# Patient Record
Sex: FEMALE | Race: WHITE | NOT HISPANIC OR LATINO | Employment: OTHER | URBAN - METROPOLITAN AREA
[De-identification: names, ages, dates, MRNs, and addresses within clinical notes are randomized per-mention and may not be internally consistent; named-entity substitution may affect disease eponyms.]

---

## 2017-01-06 ENCOUNTER — ALLSCRIPTS OFFICE VISIT (OUTPATIENT)
Dept: OTHER | Facility: OTHER | Age: 67
End: 2017-01-06

## 2017-07-07 ENCOUNTER — ALLSCRIPTS OFFICE VISIT (OUTPATIENT)
Dept: OTHER | Facility: OTHER | Age: 67
End: 2017-07-07

## 2017-07-07 ENCOUNTER — TRANSCRIBE ORDERS (OUTPATIENT)
Dept: ADMINISTRATIVE | Facility: HOSPITAL | Age: 67
End: 2017-07-07

## 2017-07-07 DIAGNOSIS — I20.0 UNSTABLE ANGINA PECTORIS (HCC): ICD-10-CM

## 2017-07-07 DIAGNOSIS — R07.89 OTHER CHEST PAIN: ICD-10-CM

## 2017-07-07 DIAGNOSIS — M54.12 RADICULOPATHY OF CERVICAL REGION: ICD-10-CM

## 2017-07-07 DIAGNOSIS — M54.12 BRACHIAL NEURITIS OR RADICULITIS NOS: Primary | ICD-10-CM

## 2017-07-10 ENCOUNTER — TELEPHONE (OUTPATIENT)
Dept: PREADMISSION TESTING | Facility: HOSPITAL | Age: 67
End: 2017-07-10

## 2017-07-13 ENCOUNTER — GENERIC CONVERSION - ENCOUNTER (OUTPATIENT)
Dept: OTHER | Facility: OTHER | Age: 67
End: 2017-07-13

## 2017-07-13 ENCOUNTER — TELEPHONE (OUTPATIENT)
Dept: PREADMISSION TESTING | Facility: HOSPITAL | Age: 67
End: 2017-07-13

## 2017-07-14 ENCOUNTER — TELEPHONE (OUTPATIENT)
Dept: PREADMISSION TESTING | Facility: HOSPITAL | Age: 67
End: 2017-07-14

## 2017-07-20 ENCOUNTER — TELEPHONE (OUTPATIENT)
Dept: PREADMISSION TESTING | Facility: HOSPITAL | Age: 67
End: 2017-07-20

## 2017-08-02 ENCOUNTER — HOSPITAL ENCOUNTER (OUTPATIENT)
Dept: RADIOLOGY | Facility: HOSPITAL | Age: 67
Discharge: HOME | End: 2017-08-02
Attending: PSYCHIATRY & NEUROLOGY
Payer: COMMERCIAL

## 2017-08-02 ENCOUNTER — ANESTHESIA EVENT (OUTPATIENT)
Dept: RADIOLOGY | Facility: HOSPITAL | Age: 67
End: 2017-08-02

## 2017-08-02 ENCOUNTER — ANESTHESIA (OUTPATIENT)
Dept: RADIOLOGY | Facility: HOSPITAL | Age: 67
End: 2017-08-02

## 2017-08-02 VITALS
HEIGHT: 63 IN | SYSTOLIC BLOOD PRESSURE: 159 MMHG | OXYGEN SATURATION: 100 % | HEART RATE: 81 BPM | DIASTOLIC BLOOD PRESSURE: 81 MMHG | BODY MASS INDEX: 20.38 KG/M2 | RESPIRATION RATE: 16 BRPM | TEMPERATURE: 97.5 F | WEIGHT: 115 LBS

## 2017-08-02 DIAGNOSIS — R07.89 OTHER CHEST PAIN: ICD-10-CM

## 2017-08-02 DIAGNOSIS — M54.12 RADICULOPATHY OF CERVICAL REGION: ICD-10-CM

## 2017-08-02 PROCEDURE — 72141 MRI NECK SPINE W/O DYE: CPT

## 2017-08-02 PROCEDURE — 72146 MRI CHEST SPINE W/O DYE: CPT

## 2017-08-02 RX ORDER — CITALOPRAM 10 MG/1
5 TABLET ORAL DAILY
COMMUNITY
End: 2018-04-13 | Stop reason: ALTCHOICE

## 2017-08-02 RX ORDER — SODIUM CHLORIDE, SODIUM LACTATE, POTASSIUM CHLORIDE, CALCIUM CHLORIDE 600; 310; 30; 20 MG/100ML; MG/100ML; MG/100ML; MG/100ML
20 INJECTION, SOLUTION INTRAVENOUS CONTINUOUS
Status: DISCONTINUED | OUTPATIENT
Start: 2017-08-02 | End: 2017-08-03 | Stop reason: HOSPADM

## 2017-08-02 RX ADMIN — SODIUM CHLORIDE, SODIUM LACTATE, POTASSIUM CHLORIDE, AND CALCIUM CHLORIDE 20 ML/HR: .6; .31; .03; .02 INJECTION, SOLUTION INTRAVENOUS at 11:39

## 2017-09-05 ENCOUNTER — DOCTOR'S OFFICE (OUTPATIENT)
Dept: URBAN - METROPOLITAN AREA CLINIC 136 | Facility: CLINIC | Age: 67
Setting detail: OPHTHALMOLOGY
End: 2017-09-05
Payer: COMMERCIAL

## 2017-09-05 DIAGNOSIS — H25.13: ICD-10-CM

## 2017-09-05 DIAGNOSIS — H52.4: ICD-10-CM

## 2017-09-05 DIAGNOSIS — H52.03: ICD-10-CM

## 2017-09-05 DIAGNOSIS — H52.223: ICD-10-CM

## 2017-09-05 DIAGNOSIS — H35.711: ICD-10-CM

## 2017-09-05 DIAGNOSIS — H04.123: ICD-10-CM

## 2017-09-05 PROCEDURE — 92015 DETERMINE REFRACTIVE STATE: CPT | Performed by: OPTOMETRIST

## 2017-09-05 PROCEDURE — 92014 COMPRE OPH EXAM EST PT 1/>: CPT | Performed by: OPTOMETRIST

## 2017-09-05 ASSESSMENT — REFRACTION_AUTOREFRACTION
OD_CYLINDER: -1.25
OS_SPHERE: +3.00
OD_SPHERE: +3.25
OD_AXIS: 103
OS_AXIS: 100
OS_CYLINDER: -0.75

## 2017-09-05 ASSESSMENT — REFRACTION_CURRENTRX
OS_CYLINDER: -0.75
OD_OVR_VA: 20/
OD_AXIS: 099
OD_CYLINDER: -1.00
OD_ADD: +2.75
OS_SPHERE: +3.00
OS_AXIS: 089
OS_VPRISM_DIRECTION: PROGS
OS_ADD: +2.75
OD_OVR_VA: 20/
OD_VPRISM_DIRECTION: PROGS
OD_SPHERE: +3.00
OS_OVR_VA: 20/
OS_OVR_VA: 20/
OD_OVR_VA: 20/
OS_OVR_VA: 20/

## 2017-09-05 ASSESSMENT — REFRACTION_OUTSIDERX
OD_VA1: 20/20-3
OS_VA1: 20/20-2
OS_VA3: 20/
OS_SPHERE: +3.50
OU_VA: 20/20-2
OD_VA2: 20/20
OD_SPHERE: +3.25
OS_VA2: 20/20
OS_CYLINDER: -0.75
OS_ADD: +2.75
OD_AXIS: 100
OD_ADD: +2.75
OD_VA3: 20/
OS_AXIS: 090
OD_CYLINDER: -1.25

## 2017-09-05 ASSESSMENT — SPHEQUIV_DERIVED
OD_SPHEQUIV: 2.625
OS_SPHEQUIV: 2.625

## 2017-09-05 ASSESSMENT — REFRACTION_MANIFEST
OS_VA3: 20/
OS_VA2: 20/
OD_VA3: 20/
OS_VA1: 20/
OU_VA: 20/
OS_VA1: 20/
OD_VA2: 20/
OS_VA3: 20/
OS_VA2: 20/
OD_VA3: 20/
OD_VA1: 20/
OD_VA2: 20/
OD_VA1: 20/
OU_VA: 20/

## 2017-09-05 ASSESSMENT — VISUAL ACUITY
OD_BCVA: 20/30-1
OS_BCVA: 20/20-2

## 2017-09-05 ASSESSMENT — DECREASING TEAR LAKE - SEVERITY SCORE
OD_DEC_TEARLAKE: 2+
OS_DEC_TEARLAKE: 2+

## 2017-09-05 ASSESSMENT — TEAR BREAK UP TIME (TBUT)
OD_TBUT: 1+
OS_TBUT: 1+

## 2017-09-05 ASSESSMENT — CONFRONTATIONAL VISUAL FIELD TEST (CVF)
OS_FINDINGS: FULL
OD_FINDINGS: FULL

## 2017-10-03 ENCOUNTER — TRANSCRIBE ORDERS (OUTPATIENT)
Dept: ADMINISTRATIVE | Facility: HOSPITAL | Age: 67
End: 2017-10-03

## 2017-10-03 DIAGNOSIS — Z78.0 MENOPAUSE: Primary | ICD-10-CM

## 2017-10-03 DIAGNOSIS — Z12.31 ENCOUNTER FOR SCREENING MAMMOGRAM FOR MALIGNANT NEOPLASM OF BREAST: Primary | ICD-10-CM

## 2017-11-14 ENCOUNTER — HOSPITAL ENCOUNTER (OUTPATIENT)
Dept: MAMMOGRAPHY | Facility: MEDICAL CENTER | Age: 67
Discharge: HOME | End: 2017-11-14
Payer: COMMERCIAL

## 2017-11-14 ENCOUNTER — TRANSCRIBE ORDERS (OUTPATIENT)
Dept: ADMINISTRATIVE | Facility: HOSPITAL | Age: 67
End: 2017-11-14

## 2017-11-14 ENCOUNTER — HOSPITAL ENCOUNTER (OUTPATIENT)
Dept: BONE DENSITY | Facility: MEDICAL CENTER | Age: 67
Discharge: HOME | End: 2017-11-14
Payer: COMMERCIAL

## 2017-11-14 DIAGNOSIS — Z78.0 MENOPAUSE: ICD-10-CM

## 2017-11-14 DIAGNOSIS — Z12.31 ENCOUNTER FOR SCREENING MAMMOGRAM FOR MALIGNANT NEOPLASM OF BREAST: ICD-10-CM

## 2017-11-14 DIAGNOSIS — Z12.39 SCREENING FOR BREAST CANCER: Primary | ICD-10-CM

## 2017-11-14 PROCEDURE — G0202 SCR MAMMO BI INCL CAD: HCPCS

## 2017-11-14 PROCEDURE — 77080 DXA BONE DENSITY AXIAL: CPT

## 2018-01-13 VITALS
SYSTOLIC BLOOD PRESSURE: 134 MMHG | BODY MASS INDEX: 20.23 KG/M2 | HEART RATE: 89 BPM | WEIGHT: 116 LBS | DIASTOLIC BLOOD PRESSURE: 61 MMHG

## 2018-01-13 VITALS
BODY MASS INDEX: 21.62 KG/M2 | WEIGHT: 124 LBS | SYSTOLIC BLOOD PRESSURE: 135 MMHG | DIASTOLIC BLOOD PRESSURE: 69 MMHG | HEART RATE: 86 BPM

## 2018-04-13 ENCOUNTER — OFFICE VISIT (OUTPATIENT)
Dept: NEUROLOGY | Facility: CLINIC | Age: 68
End: 2018-04-13
Payer: COMMERCIAL

## 2018-04-13 VITALS
BODY MASS INDEX: 21.24 KG/M2 | DIASTOLIC BLOOD PRESSURE: 69 MMHG | SYSTOLIC BLOOD PRESSURE: 146 MMHG | WEIGHT: 118 LBS | HEART RATE: 102 BPM

## 2018-04-13 DIAGNOSIS — F41.1 GENERALIZED ANXIETY DISORDER: ICD-10-CM

## 2018-04-13 DIAGNOSIS — R20.2 PARESTHESIAS: ICD-10-CM

## 2018-04-13 DIAGNOSIS — M54.12 CERVICAL RADICULOPATHY: Primary | ICD-10-CM

## 2018-04-13 PROBLEM — F41.9 ANXIETY DISORDER: Status: ACTIVE | Noted: 2018-04-13

## 2018-04-13 PROCEDURE — 99214 OFFICE O/P EST MOD 30 MIN: CPT | Performed by: PSYCHIATRY & NEUROLOGY

## 2018-04-13 RX ORDER — ESCITALOPRAM OXALATE 5 MG/1
15 TABLET ORAL DAILY
COMMUNITY

## 2018-04-13 NOTE — PROGRESS NOTES
Patient ID: Floyd Real is a 79 y o  female  Assessment/Plan:     Problem List Items Addressed This Visit        Nervous and Auditory    Cervical radiculopathy - Primary       Other    Paresthesias    Anxiety disorder    Relevant Medications    escitalopram (LEXAPRO) 5 mg tablet             Subjective:    HPI   We had the pleasure of evaluating Rigoberto Baker in neurological follow up today  As you know she is a 79year old female  She is a retired   Who is here today for evaluation of her peripheral Neuropathy  Panic attack/anxiety:   -since last seen patient states that she had few panic attacks for which she ended up seeing her primary care physician  He started her on Lexapro 5 mg  She states that since she has been on Lexapro she has not had any chest tightness  She does still get some panic attacks but they have decreased in frequency significantly they may occur now every few months  There is some stressor in her life as she is flying to [place] with her daughter today to adopt their 2nd child  Left arm pain:  - no new concerns  She now feels that she is 75% better  She has ntoed when she is in a hurry she may have things fall out of her hand  Paresthesia in her limbs:   - She has noted she can go two months with no pain but then will start with numbness/tingling in her jaw and then travel to the rest of her body  At time will have tingling sensation her lower back  This may go on for few weeks and then dissipate  90% of the time she feels fine  She states when she upset this tends to get worse for her  She is not interested in taking lorazepam    - No change    Trigger: Finds that fatigue and anxiety will trigger her symptoms       EM  Showed mild to moderated CTS and Left mild C 7 radiculopathy:   -  no CT or MRI done as ct was not covered by insurance and she could not get an open MRI    - Neck pain: Saw pain management and had epidural injection which affected her sleep    I have reviewed the ROS  The following portions of the patient's history were reviewed and updated as appropriate: allergies, current medications, past family history, past medical history, past social history, past surgical history and problem list     Review system review     Objective:    Blood pressure 146/69, pulse 102, weight 53 5 kg (118 lb)  Physical Exam   Constitutional: She appears well-developed  Eyes: EOM are normal  Pupils are equal, round, and reactive to light  Neck: Normal range of motion  Neck supple  Cardiovascular: Normal rate  Pulmonary/Chest: Effort normal    Abdominal: Soft  Musculoskeletal: Normal range of motion  Neurological: She has normal strength and normal reflexes  Gait and coordination normal    Skin: Skin is warm  Psychiatric: She has a normal mood and affect  Her speech is normal        Neurological Exam    Mental Status  The patient is alert  Her speech is normal  Her language is fluent with no aphasia  She has normal attention span and concentration  Cranial Nerves    CN II: The patient's visual acuity and visual fields are normal   CN III, IV, VI: The patient's pupils are equally round and reactive to light and ocular movements are normal   CN V: The patient has normal facial sensation  CN VII:  The patient has symmetric facial movement  CN VIII:  The patient's hearing is normal   CN IX, X: The patient has symmetric palate movement and normal gag reflex  CN XI: The patient's shoulder shrug strength is normal   CN XII: The patient's tongue is midline without atrophy or fasciculations  Motor  The patient has normal muscle bulk throughout  Her overall muscle tone is normal throughout  Her strength is 5/5 throughout all four extremities  Sensory  The patient's sensation is normal in all four extremities      Reflexes  Deep tendon reflexes are 2+ and symmetric in all four extremities with downgoing toes bilaterally  Gait and Coordination  The patient has normal gait and station and normal casual, toe, heel, and tandem gait  She has normal coordination bilaterally  ROS:    Review of Systems   Constitutional: Negative  HENT: Negative  Eyes: Negative  Respiratory: Negative  Cardiovascular: Negative  Gastrointestinal: Negative  Endocrine: Negative  Genitourinary: Negative  Musculoskeletal: Negative  Skin: Negative  Allergic/Immunologic: Negative  Neurological: Negative  Hematological: Negative  Psychiatric/Behavioral: Negative

## 2018-09-05 ENCOUNTER — DOCTOR'S OFFICE (OUTPATIENT)
Dept: URBAN - METROPOLITAN AREA CLINIC 136 | Facility: CLINIC | Age: 68
Setting detail: OPHTHALMOLOGY
End: 2018-09-05
Payer: COMMERCIAL

## 2018-09-05 DIAGNOSIS — H04.123: ICD-10-CM

## 2018-09-05 DIAGNOSIS — H52.4: ICD-10-CM

## 2018-09-05 DIAGNOSIS — H52.223: ICD-10-CM

## 2018-09-05 DIAGNOSIS — H52.03: ICD-10-CM

## 2018-09-05 DIAGNOSIS — H25.13: ICD-10-CM

## 2018-09-05 DIAGNOSIS — H35.711: ICD-10-CM

## 2018-09-05 PROCEDURE — 92014 COMPRE OPH EXAM EST PT 1/>: CPT | Performed by: OPTOMETRIST

## 2018-09-05 PROCEDURE — 92015 DETERMINE REFRACTIVE STATE: CPT | Performed by: OPTOMETRIST

## 2018-09-05 PROCEDURE — 92134 CPTRZ OPH DX IMG PST SGM RTA: CPT | Performed by: OPTOMETRIST

## 2018-09-05 ASSESSMENT — TEAR BREAK UP TIME (TBUT)
OS_TBUT: 1+
OD_TBUT: 1+

## 2018-09-05 ASSESSMENT — REFRACTION_MANIFEST
OD_VA3: 20/
OD_CYLINDER: -1.25
OS_VA3: 20/
OD_VA2: 20/
OS_AXIS: 090
OD_VA1: 20/20-2
OS_VA1: 20/20-2
OD_AXIS: 100
OD_SPHERE: +3.25
OS_VA3: 20/
OU_VA: 20/20-2
OS_VA2: 20/
OS_ADD: +2.75
OS_VA1: 20/
OD_VA2: 20/20
OD_VA1: 20/
OD_ADD: +2.75
OD_VA3: 20/
OS_SPHERE: +3.50
OS_VA2: 20/20
OU_VA: 20/
OS_CYLINDER: -0.75

## 2018-09-05 ASSESSMENT — REFRACTION_AUTOREFRACTION
OS_CYLINDER: -1.00
OD_SPHERE: +3.50
OD_CYLINDER: -1.50
OD_AXIS: 105
OS_SPHERE: +3.75
OS_AXIS: 094

## 2018-09-05 ASSESSMENT — VISUAL ACUITY
OS_BCVA: 20/25
OD_BCVA: 20/30+2

## 2018-09-05 ASSESSMENT — REFRACTION_CURRENTRX
OD_SPHERE: +3.00
OD_AXIS: 099
OS_SPHERE: +3.00
OD_VPRISM_DIRECTION: PROGS
OD_CYLINDER: -1.00
OD_OVR_VA: 20/
OD_ADD: +2.75
OS_OVR_VA: 20/
OS_VPRISM_DIRECTION: PROGS
OS_OVR_VA: 20/
OS_AXIS: 089
OS_OVR_VA: 20/
OS_ADD: +2.75
OS_CYLINDER: -0.75
OD_OVR_VA: 20/
OD_OVR_VA: 20/

## 2018-09-05 ASSESSMENT — SPHEQUIV_DERIVED
OD_SPHEQUIV: 2.75
OS_SPHEQUIV: 3.125
OS_SPHEQUIV: 3.25
OD_SPHEQUIV: 2.625

## 2018-09-05 ASSESSMENT — DECREASING TEAR LAKE - SEVERITY SCORE
OD_DEC_TEARLAKE: 2+
OS_DEC_TEARLAKE: 2+

## 2018-09-05 ASSESSMENT — CONFRONTATIONAL VISUAL FIELD TEST (CVF)
OD_FINDINGS: FULL
OS_FINDINGS: FULL

## 2018-10-04 DIAGNOSIS — G43.709 CHRONIC MIGRAINE WITHOUT AURA WITHOUT STATUS MIGRAINOSUS, NOT INTRACTABLE: Primary | ICD-10-CM

## 2018-10-04 RX ORDER — NORTRIPTYLINE HYDROCHLORIDE 10 MG/1
CAPSULE ORAL
Qty: 360 CAPSULE | Refills: 1 | Status: SHIPPED | OUTPATIENT
Start: 2018-10-04 | End: 2019-02-18 | Stop reason: SDUPTHER

## 2018-11-15 ENCOUNTER — HOSPITAL ENCOUNTER (OUTPATIENT)
Dept: MAMMOGRAPHY | Facility: MEDICAL CENTER | Age: 68
Discharge: HOME | End: 2018-11-15
Payer: COMMERCIAL

## 2018-11-15 ENCOUNTER — TRANSCRIBE ORDERS (OUTPATIENT)
Dept: ADMINISTRATIVE | Facility: HOSPITAL | Age: 68
End: 2018-11-15

## 2018-11-15 VITALS — BODY MASS INDEX: 22.08 KG/M2 | WEIGHT: 120 LBS | HEIGHT: 62 IN

## 2018-11-15 DIAGNOSIS — Z12.39 SCREENING FOR BREAST CANCER: ICD-10-CM

## 2018-11-15 DIAGNOSIS — Z12.31 VISIT FOR SCREENING MAMMOGRAM: Primary | ICD-10-CM

## 2018-11-15 PROCEDURE — 77067 SCR MAMMO BI INCL CAD: CPT

## 2019-02-15 ENCOUNTER — TELEPHONE (OUTPATIENT)
Dept: NEUROLOGY | Facility: CLINIC | Age: 69
End: 2019-02-15

## 2019-02-18 DIAGNOSIS — G43.709 CHRONIC MIGRAINE WITHOUT AURA WITHOUT STATUS MIGRAINOSUS, NOT INTRACTABLE: ICD-10-CM

## 2019-02-18 RX ORDER — NORTRIPTYLINE HYDROCHLORIDE 10 MG/1
40 CAPSULE ORAL
Qty: 360 CAPSULE | Refills: 0 | Status: SHIPPED | OUTPATIENT
Start: 2019-02-18 | End: 2019-02-19

## 2019-02-19 NOTE — TELEPHONE ENCOUNTER
Patient states the nortriptyline was sent to wrong pharmacy, she called them and cancelled the order because she needs to use mail order  Patient requesting we resend rx to [facility] order for 90 day supply

## 2019-02-20 RX ORDER — NORTRIPTYLINE HYDROCHLORIDE 10 MG/1
40 CAPSULE ORAL
Qty: 360 CAPSULE | Refills: 0 | Status: SHIPPED | OUTPATIENT
Start: 2019-02-20 | End: 2020-06-16 | Stop reason: SDUPTHER

## 2019-03-21 ENCOUNTER — TELEPHONE (OUTPATIENT)
Dept: NEUROLOGY | Facility: CLINIC | Age: 69
End: 2019-03-21

## 2019-03-27 DIAGNOSIS — G43.709 CHRONIC MIGRAINE WITHOUT AURA WITHOUT STATUS MIGRAINOSUS, NOT INTRACTABLE: ICD-10-CM

## 2019-03-27 RX ORDER — NORTRIPTYLINE HYDROCHLORIDE 10 MG/1
CAPSULE ORAL
Qty: 360 CAPSULE | Refills: 1 | Status: SHIPPED | OUTPATIENT
Start: 2019-03-27 | End: 2019-05-15

## 2019-05-15 ENCOUNTER — LAB (OUTPATIENT)
Dept: LAB | Facility: CLINIC | Age: 69
End: 2019-05-15
Payer: COMMERCIAL

## 2019-05-15 ENCOUNTER — OFFICE VISIT (OUTPATIENT)
Dept: NEUROLOGY | Facility: CLINIC | Age: 69
End: 2019-05-15
Payer: COMMERCIAL

## 2019-05-15 VITALS
DIASTOLIC BLOOD PRESSURE: 59 MMHG | SYSTOLIC BLOOD PRESSURE: 126 MMHG | WEIGHT: 121 LBS | HEART RATE: 92 BPM | BODY MASS INDEX: 22.26 KG/M2 | HEIGHT: 62 IN

## 2019-05-15 DIAGNOSIS — E55.9 VITAMIN D DEFICIENCY: ICD-10-CM

## 2019-05-15 DIAGNOSIS — R20.2 PARESTHESIAS: Primary | ICD-10-CM

## 2019-05-15 DIAGNOSIS — E53.8 VITAMIN B12 DEFICIENCY: ICD-10-CM

## 2019-05-15 DIAGNOSIS — M54.12 CERVICAL RADICULOPATHY: ICD-10-CM

## 2019-05-15 LAB
25(OH)D3 SERPL-MCNC: 34.5 NG/ML (ref 30–100)
VIT B12 SERPL-MCNC: 758 PG/ML (ref 100–900)

## 2019-05-15 PROCEDURE — 82306 VITAMIN D 25 HYDROXY: CPT

## 2019-05-15 PROCEDURE — 99214 OFFICE O/P EST MOD 30 MIN: CPT | Performed by: PHYSICIAN ASSISTANT

## 2019-05-15 PROCEDURE — 36415 COLL VENOUS BLD VENIPUNCTURE: CPT

## 2019-05-15 PROCEDURE — 82607 VITAMIN B-12: CPT

## 2019-05-15 RX ORDER — TIZANIDINE 2 MG/1
2 TABLET ORAL
Qty: 30 TABLET | Refills: 0 | Status: SHIPPED | OUTPATIENT
Start: 2019-05-15 | End: 2020-05-01 | Stop reason: ALTCHOICE

## 2019-05-15 RX ORDER — FLUTICASONE PROPIONATE 50 MCG
2 SPRAY, SUSPENSION (ML) NASAL AS NEEDED
COMMUNITY

## 2019-06-25 DIAGNOSIS — G43.709 CHRONIC MIGRAINE WITHOUT AURA WITHOUT STATUS MIGRAINOSUS, NOT INTRACTABLE: ICD-10-CM

## 2019-06-25 RX ORDER — NORTRIPTYLINE HYDROCHLORIDE 10 MG/1
CAPSULE ORAL
Qty: 360 CAPSULE | Refills: 1 | Status: SHIPPED | OUTPATIENT
Start: 2019-06-25 | End: 2020-05-06 | Stop reason: SDUPTHER

## 2019-09-10 ENCOUNTER — DOCTOR'S OFFICE (OUTPATIENT)
Dept: URBAN - METROPOLITAN AREA CLINIC 136 | Facility: CLINIC | Age: 69
Setting detail: OPHTHALMOLOGY
End: 2019-09-10

## 2019-09-10 DIAGNOSIS — H52.223: ICD-10-CM

## 2019-09-10 DIAGNOSIS — H25.13: ICD-10-CM

## 2019-09-10 DIAGNOSIS — H35.711: ICD-10-CM

## 2019-09-10 DIAGNOSIS — H04.123: ICD-10-CM

## 2019-09-10 DIAGNOSIS — H52.03: ICD-10-CM

## 2019-09-10 PROCEDURE — SELFPAYVIS VISION VISIT SELF PAY: Performed by: OPTOMETRIST

## 2019-09-10 ASSESSMENT — REFRACTION_CURRENTRX
OS_AXIS: 089
OD_ADD: +2.75
OD_VPRISM_DIRECTION: PROGS
OD_SPHERE: +3.00
OD_AXIS: 099
OS_OVR_VA: 20/
OS_ADD: +2.75
OS_CYLINDER: -0.75
OD_OVR_VA: 20/
OS_VPRISM_DIRECTION: PROGS
OD_OVR_VA: 20/
OS_OVR_VA: 20/
OD_CYLINDER: -1.00
OD_OVR_VA: 20/
OS_OVR_VA: 20/
OS_SPHERE: +3.00

## 2019-09-10 ASSESSMENT — REFRACTION_AUTOREFRACTION
OD_SPHERE: +2.50
OD_CYLINDER: +1.75
OS_AXIS: 22
OS_SPHERE: +3.00
OD_AXIS: 12
OS_CYLINDER: +1.00

## 2019-09-10 ASSESSMENT — REFRACTION_MANIFEST
OD_ADD: +2.75
OU_VA: 20/20
OS_SPHERE: +3.75
OS_CYLINDER: -0.75
OS_AXIS: 100
OD_VA2: 20/20
OS_VA1: 20/20-2
OD_VA3: 20/
OD_VA3: 20/
OS_VA2: 20/
OD_AXIS: 100
OD_SPHERE: +3.50
OD_VA2: 20/
OS_VA3: 20/
OS_VA1: 20/
OS_VA2: 20/20
OS_VA3: 20/
OS_ADD: +2.75
OD_VA1: 20/
OD_VA1: 20/20
OU_VA: 20/
OD_CYLINDER: -1.25

## 2019-09-10 ASSESSMENT — SPHEQUIV_DERIVED
OD_SPHEQUIV: 3.375
OS_SPHEQUIV: 3.375
OS_SPHEQUIV: 3.5
OD_SPHEQUIV: 2.875

## 2019-09-10 ASSESSMENT — CONFRONTATIONAL VISUAL FIELD TEST (CVF)
OS_FINDINGS: FULL
OD_FINDINGS: FULL

## 2019-09-10 ASSESSMENT — DECREASING TEAR LAKE - SEVERITY SCORE
OD_DEC_TEARLAKE: 2+
OS_DEC_TEARLAKE: 2+

## 2019-09-10 ASSESSMENT — TEAR BREAK UP TIME (TBUT)
OD_TBUT: 1+
OS_TBUT: 1+

## 2019-09-10 ASSESSMENT — VISUAL ACUITY
OD_BCVA: 20/30
OS_BCVA: 20/25

## 2019-09-19 DIAGNOSIS — G43.709 CHRONIC MIGRAINE WITHOUT AURA WITHOUT STATUS MIGRAINOSUS, NOT INTRACTABLE: ICD-10-CM

## 2019-09-19 RX ORDER — NORTRIPTYLINE HYDROCHLORIDE 10 MG/1
CAPSULE ORAL
Qty: 360 CAPSULE | Refills: 1 | Status: SHIPPED | OUTPATIENT
Start: 2019-09-19 | End: 2020-03-16

## 2019-11-06 ENCOUNTER — TRANSCRIBE ORDERS (OUTPATIENT)
Dept: ADMINISTRATIVE | Facility: HOSPITAL | Age: 69
End: 2019-11-06

## 2019-11-06 DIAGNOSIS — Z13.820 SPECIAL SCREENING FOR OSTEOPOROSIS: Primary | ICD-10-CM

## 2019-11-19 ENCOUNTER — HOSPITAL ENCOUNTER (OUTPATIENT)
Dept: BONE DENSITY | Facility: MEDICAL CENTER | Age: 69
Discharge: HOME | End: 2019-11-19
Payer: COMMERCIAL

## 2019-11-19 ENCOUNTER — HOSPITAL ENCOUNTER (OUTPATIENT)
Dept: MAMMOGRAPHY | Facility: MEDICAL CENTER | Age: 69
Discharge: HOME | End: 2019-11-19
Payer: COMMERCIAL

## 2019-11-19 ENCOUNTER — TRANSCRIBE ORDERS (OUTPATIENT)
Dept: ADMINISTRATIVE | Facility: HOSPITAL | Age: 69
End: 2019-11-19

## 2019-11-19 VITALS — WEIGHT: 121 LBS | HEIGHT: 62 IN | BODY MASS INDEX: 22.26 KG/M2

## 2019-11-19 DIAGNOSIS — Z12.31 VISIT FOR SCREENING MAMMOGRAM: ICD-10-CM

## 2019-11-19 DIAGNOSIS — Z12.39 SCREENING FOR MALIGNANT NEOPLASM OF BREAST: ICD-10-CM

## 2019-11-19 DIAGNOSIS — Z13.820 SPECIAL SCREENING FOR OSTEOPOROSIS: ICD-10-CM

## 2019-11-19 DIAGNOSIS — Z12.31 VISIT FOR SCREENING MAMMOGRAM: Primary | ICD-10-CM

## 2019-11-19 DIAGNOSIS — M85.80 SENILE OSTEOPENIA: ICD-10-CM

## 2019-11-19 PROCEDURE — 77067 SCR MAMMO BI INCL CAD: CPT

## 2019-11-19 PROCEDURE — 77080 DXA BONE DENSITY AXIAL: CPT

## 2020-03-16 DIAGNOSIS — G43.709 CHRONIC MIGRAINE WITHOUT AURA WITHOUT STATUS MIGRAINOSUS, NOT INTRACTABLE: ICD-10-CM

## 2020-03-16 RX ORDER — NORTRIPTYLINE HYDROCHLORIDE 10 MG/1
CAPSULE ORAL
Qty: 360 CAPSULE | Refills: 1 | Status: SHIPPED | OUTPATIENT
Start: 2020-03-16 | End: 2020-05-06 | Stop reason: SDUPTHER

## 2020-05-06 ENCOUNTER — TELEMEDICINE (OUTPATIENT)
Dept: NEUROLOGY | Facility: CLINIC | Age: 70
End: 2020-05-06
Payer: COMMERCIAL

## 2020-05-06 DIAGNOSIS — R20.2 PARESTHESIAS: Primary | ICD-10-CM

## 2020-05-06 PROCEDURE — 99214 OFFICE O/P EST MOD 30 MIN: CPT | Performed by: PHYSICIAN ASSISTANT

## 2020-06-16 DIAGNOSIS — G43.709 CHRONIC MIGRAINE WITHOUT AURA WITHOUT STATUS MIGRAINOSUS, NOT INTRACTABLE: ICD-10-CM

## 2020-06-16 RX ORDER — NORTRIPTYLINE HYDROCHLORIDE 10 MG/1
40 CAPSULE ORAL
Qty: 360 CAPSULE | Refills: 3 | Status: SHIPPED | OUTPATIENT
Start: 2020-06-16 | End: 2021-05-04 | Stop reason: SDUPTHER

## 2020-09-23 ENCOUNTER — DOCTOR'S OFFICE (OUTPATIENT)
Dept: URBAN - METROPOLITAN AREA CLINIC 136 | Facility: CLINIC | Age: 70
Setting detail: OPHTHALMOLOGY
End: 2020-09-23
Payer: COMMERCIAL

## 2020-09-23 VITALS — HEIGHT: 55 IN

## 2020-09-23 DIAGNOSIS — H52.03: ICD-10-CM

## 2020-09-23 DIAGNOSIS — H52.223: ICD-10-CM

## 2020-09-23 DIAGNOSIS — H35.711: ICD-10-CM

## 2020-09-23 DIAGNOSIS — H25.13: ICD-10-CM

## 2020-09-23 DIAGNOSIS — H52.4: ICD-10-CM

## 2020-09-23 DIAGNOSIS — H04.123: ICD-10-CM

## 2020-09-23 PROCEDURE — 92014 COMPRE OPH EXAM EST PT 1/>: CPT | Performed by: OPTOMETRIST

## 2020-09-23 PROCEDURE — 92015 DETERMINE REFRACTIVE STATE: CPT | Performed by: OPTOMETRIST

## 2020-09-23 ASSESSMENT — REFRACTION_CURRENTRX
OS_AXIS: 089
OD_OVR_VA: 20/
OS_VPRISM_DIRECTION: PROGS
OD_VPRISM_DIRECTION: PROGS
OS_OVR_VA: 20/
OD_AXIS: 099
OD_CYLINDER: -1.25
OD_SPHERE: +3.50
OD_ADD: +2.75
OS_SPHERE: +3.75
OS_CYLINDER: -0.75
OS_ADD: +2.75

## 2020-09-23 ASSESSMENT — VISUAL ACUITY
OS_BCVA: 20/25-1
OD_BCVA: 20/30-1

## 2020-09-23 ASSESSMENT — REFRACTION_MANIFEST
OU_VA: 20/20
OD_ADD: +2.75
OD_AXIS: 100
OS_CYLINDER: -1.00
OS_SPHERE: +3.50
OS_VA1: 20/25
OS_AXIS: 090
OD_CYLINDER: -1.25
OS_ADD: +2.75
OD_VA2: 20/20
OD_SPHERE: +3.50
OD_VA1: 20/20
OS_VA2: 20/20

## 2020-09-23 ASSESSMENT — REFRACTION_AUTOREFRACTION
OD_SPHERE: +3.75
OS_AXIS: 093
OD_AXIS: 106
OD_CYLINDER: -0.75
OS_SPHERE: +4.75
OS_CYLINDER: +1.25

## 2020-09-23 ASSESSMENT — DECREASING TEAR LAKE - SEVERITY SCORE
OS_DEC_TEARLAKE: 2+
OD_DEC_TEARLAKE: 2+

## 2020-09-23 ASSESSMENT — TEAR BREAK UP TIME (TBUT)
OS_TBUT: 1+
OD_TBUT: 1+

## 2020-09-23 ASSESSMENT — CONFRONTATIONAL VISUAL FIELD TEST (CVF)
OS_FINDINGS: FULL
OD_FINDINGS: FULL

## 2020-09-23 ASSESSMENT — SPHEQUIV_DERIVED
OD_SPHEQUIV: 2.875
OS_SPHEQUIV: 3
OD_SPHEQUIV: 3.375
OS_SPHEQUIV: 5.375

## 2020-11-20 ENCOUNTER — HOSPITAL ENCOUNTER (OUTPATIENT)
Dept: MAMMOGRAPHY | Facility: MEDICAL CENTER | Age: 70
Discharge: HOME | End: 2020-11-20
Payer: COMMERCIAL

## 2020-11-20 VITALS — WEIGHT: 121 LBS | HEIGHT: 62 IN | BODY MASS INDEX: 22.26 KG/M2

## 2020-11-20 DIAGNOSIS — Z12.31 VISIT FOR SCREENING MAMMOGRAM: ICD-10-CM

## 2020-11-20 PROCEDURE — 77067 SCR MAMMO BI INCL CAD: CPT

## 2020-11-20 PROCEDURE — 77063 BREAST TOMOSYNTHESIS BI: CPT

## 2021-03-30 DIAGNOSIS — Z23 ENCOUNTER FOR IMMUNIZATION: ICD-10-CM

## 2021-05-04 ENCOUNTER — OFFICE VISIT (OUTPATIENT)
Dept: NEUROLOGY | Facility: CLINIC | Age: 71
End: 2021-05-04
Payer: COMMERCIAL

## 2021-05-04 VITALS
SYSTOLIC BLOOD PRESSURE: 122 MMHG | HEIGHT: 62 IN | HEART RATE: 82 BPM | DIASTOLIC BLOOD PRESSURE: 70 MMHG | BODY MASS INDEX: 22.54 KG/M2 | WEIGHT: 122.5 LBS

## 2021-05-04 DIAGNOSIS — G43.709 CHRONIC MIGRAINE WITHOUT AURA WITHOUT STATUS MIGRAINOSUS, NOT INTRACTABLE: ICD-10-CM

## 2021-05-04 DIAGNOSIS — R20.2 PARESTHESIAS: ICD-10-CM

## 2021-05-04 DIAGNOSIS — F41.1 GENERALIZED ANXIETY DISORDER: ICD-10-CM

## 2021-05-04 DIAGNOSIS — M54.12 CERVICAL RADICULOPATHY: Primary | ICD-10-CM

## 2021-05-04 PROCEDURE — 99215 OFFICE O/P EST HI 40 MIN: CPT | Performed by: PHYSICIAN ASSISTANT

## 2021-05-04 RX ORDER — NORTRIPTYLINE HYDROCHLORIDE 10 MG/1
40 CAPSULE ORAL
Qty: 360 CAPSULE | Refills: 3 | Status: SHIPPED | OUTPATIENT
Start: 2021-05-04 | End: 2022-05-03 | Stop reason: SDUPTHER

## 2021-05-04 NOTE — ASSESSMENT & PLAN NOTE
Continue Nortriptyline 40 mg at bedtime  Continue Lexapro 5 mg per pcp    Follow up in 1 year  Call if worsens    We did discuss adding cyclobenzaprine or tegretol  Patient defers at this time but will call if she changes her mind

## 2021-05-04 NOTE — PROGRESS NOTES
Review of Systems   Constitutional: Negative  HENT: Negative  Eyes: Negative  Respiratory: Negative  Cardiovascular: Negative  Gastrointestinal: Negative  Endocrine: Negative  Genitourinary: Negative  Musculoskeletal: Negative  Skin: Negative  Allergic/Immunologic: Negative  Neurological: Positive for numbness (slightly worse)  Hematological: Negative  Psychiatric/Behavioral: Negative

## 2021-05-04 NOTE — PATIENT INSTRUCTIONS
Continue Nortriptyline 40 mg at bedtime  Continue Lexapro 5 mg per pcp    Follow up in 1 year  Call if worsens

## 2021-11-22 ENCOUNTER — HOSPITAL ENCOUNTER (OUTPATIENT)
Dept: BONE DENSITY | Facility: MEDICAL CENTER | Age: 71
Discharge: HOME | End: 2021-11-22
Payer: COMMERCIAL

## 2021-11-22 ENCOUNTER — HOSPITAL ENCOUNTER (OUTPATIENT)
Dept: MAMMOGRAPHY | Facility: MEDICAL CENTER | Age: 71
Discharge: HOME | End: 2021-11-22
Payer: COMMERCIAL

## 2021-11-22 VITALS — BODY MASS INDEX: 22.56 KG/M2 | WEIGHT: 122.58 LBS | HEIGHT: 62 IN

## 2021-11-22 DIAGNOSIS — Z13.820 ENCOUNTER FOR SCREENING FOR OSTEOPOROSIS: ICD-10-CM

## 2021-11-22 DIAGNOSIS — Z12.31 ENCOUNTER FOR SCREENING MAMMOGRAM FOR MALIGNANT NEOPLASM OF BREAST: ICD-10-CM

## 2021-11-22 DIAGNOSIS — M85.80 OSTEOPENIA, UNSPECIFIED LOCATION: ICD-10-CM

## 2021-11-22 PROCEDURE — 77063 BREAST TOMOSYNTHESIS BI: CPT

## 2021-11-22 PROCEDURE — 77067 SCR MAMMO BI INCL CAD: CPT

## 2021-11-22 PROCEDURE — 77080 DXA BONE DENSITY AXIAL: CPT

## 2022-05-03 ENCOUNTER — OFFICE VISIT (OUTPATIENT)
Dept: NEUROLOGY | Facility: CLINIC | Age: 72
End: 2022-05-03
Payer: COMMERCIAL

## 2022-05-03 VITALS
TEMPERATURE: 97.3 F | WEIGHT: 123 LBS | HEART RATE: 80 BPM | HEIGHT: 62 IN | DIASTOLIC BLOOD PRESSURE: 66 MMHG | SYSTOLIC BLOOD PRESSURE: 139 MMHG | BODY MASS INDEX: 22.63 KG/M2

## 2022-05-03 DIAGNOSIS — G43.709 CHRONIC MIGRAINE WITHOUT AURA WITHOUT STATUS MIGRAINOSUS, NOT INTRACTABLE: ICD-10-CM

## 2022-05-03 DIAGNOSIS — M54.12 CERVICAL RADICULOPATHY: Primary | ICD-10-CM

## 2022-05-03 PROCEDURE — 99215 OFFICE O/P EST HI 40 MIN: CPT | Performed by: PHYSICIAN ASSISTANT

## 2022-05-03 RX ORDER — NORTRIPTYLINE HYDROCHLORIDE 10 MG/1
40 CAPSULE ORAL
Qty: 360 CAPSULE | Refills: 3 | Status: SHIPPED | OUTPATIENT
Start: 2022-05-03

## 2022-05-03 RX ORDER — CYCLOSPORINE 0.5 MG/ML
1 EMULSION OPHTHALMIC 2 TIMES DAILY
COMMUNITY

## 2022-05-03 RX ORDER — BIOTIN 10000 MCG
10 CAPSULE ORAL DAILY
COMMUNITY

## 2022-05-03 NOTE — PROGRESS NOTES
[facility]  PATIENT:  Delonte Crespo  MRN:  [id number]  :  1950  DATE OF SERVICE:  5/3/2022      Assessment/Plan:     Chronic migraine without aura without status migrainosus, not intractable  Continue Nortriptyline 40 mg at bedtime  Continue Lexapro 5 mg per pcp    Follow up in 1 year  Call if worsens         Problem List Items Addressed This Visit        Cardiovascular and Mediastinum    Chronic migraine without aura without status migrainosus, not intractable     Continue Nortriptyline 40 mg at bedtime  Continue Lexapro 5 mg per pcp    Follow up in 1 year  Call if worsens         Relevant Medications    nortriptyline (PAMELOR) 10 mg capsule       Nervous and Auditory    Cervical radiculopathy - Primary              History of Present Illness: We had the pleasure of evaluating Delonte Crespo in neurological follow up  today for headaches  As you know,  she is a 67 y o   right handed female  She is a retired       Headaches have been well controlled  States that when she is outside during spring they worsen    Panic attack/anxiety:   PCP started her on Lexapro 5 mg  Gracie Cardenas states that since she has been on Lexapro she has not had any chest tightness   She does still get some panic attacks but they have decreased in frequency significantly they may occur 1 a month--not full panic attacks but more anxiety due to COVID etc   Able to let it go more easily       Left arm pain:  no new concerns  She now feels that she is better  Patient states this has resolved     Paresthesia in her limbs:   Patient underwent left knee arthroscopy and had an episode of syncope the next day  Patient did fall and had head injury (stitches)  Did have worsening for a few weeks after this  MOst recently is her jaw  This is worse in the evening for her  Almost feels like a throbbing in jaw line bilaterally and into cheeks  Pain is 2/10    Happens 1 time every week for the past few months  Can also happen if she does any physical activity        She travels every other weekend, 4 hours each way to care for her 3 young grandchildren  Zora Castanon she gets home she feels the most pain and the most fatigue   However, patient feels overall her neuropathy and paresthesias are stable  This will be changing as her daughter will be moving to [address] [place] [address] St is at most a 4/10  Not often enough to worry about    Patient states that if she holds her granddaughter the opposite side will go numb  Will notice tingling in her legs when she lays down  This is on occasion  Trigger: Finds that fatigue and anxiety will trigger her symptoms       EM  Showed mild to moderated CTS and Left mild C 7 radiculopathy:   Neck pain: Saw pain management and had epidural injection which affected her sleep     2017 MRI c spine  Multilevel degenerative spondylosis, mild central canal stenosis, moderate to severe bilateral foraminal stenosis C3-C4, C4-5 and C5-6     2017 MRI t spine  Mild multilevel degenerative spondylosis   No evidence of disc herniation, central canal or foraminal stenosis   Normal appearance thoracic spinal cord   Probable mild atherosclerotic wall thickening throughout the normal caliber thoracic aorta     I  personally reviewed these images     Have you used CBD or THC for your headaches and how often?  No     Reviewed old notes from physician seen in the past- see above HPI for summary of previous encounters           Past Medical History:   Diagnosis Date    Anxiety     Cervical radiculopathy at C7     Concussion 2020    Dizziness        Patient Active Problem List   Diagnosis    Paresthesias    Cervical radiculopathy    Anxiety disorder    Vitamin D deficiency    Vitamin B12 deficiency    Chronic migraine without aura without status migrainosus, not intractable       Medications:      Current Outpatient Medications   Medication Sig Dispense Refill    amLODIPine (NORVASC) 5 mg tablet Take 5 mg by mouth daily      Biotin 10 MG CAPS Take 10 mg by mouth in the morning      Calcium Carbonate-Vitamin D (CALTRATE 600+D) 600-400 MG-UNIT per tablet Take 1 tablet by mouth 2 (two) times a day       cycloSPORINE (RESTASIS) 0 05 % ophthalmic emulsion Administer 1 drop to both eyes 2 (two) times a day      escitalopram (LEXAPRO) 5 mg tablet Take 15 mg by mouth daily        ezetimibe (ZETIA) 10 mg tablet Take 10 mg by mouth daily      fluticasone (FLONASE) 50 mcg/act nasal spray 2 sprays into each nostril as needed        Magnesium 500 MG CAPS Take 500 mg by mouth daily        Multiple Vitamins-Minerals (CENTRUM SILVER PO) Take 1 tablet by mouth daily        nortriptyline (PAMELOR) 10 mg capsule Take 4 capsules (40 mg total) by mouth daily at bedtime 360 capsule 3    Probiotic Product (PROBIOTIC PO) Take 1 tablet by mouth in the morning      TURMERIC PO Take 2 tablets by mouth in the morning       No current facility-administered medications for this visit  Allergies:       Allergies   Allergen Reactions    Aspirin GI Intolerance     Other reaction(s): Abdominal Discomfort, GI Intolerance      Gabapentin Other (See Comments) and Dizziness     disoriented    Motrin [Ibuprofen] Other (See Comments)     Sweating, arm pain       Family History:     Family History   Problem Relation Age of Onset    Breast cancer Mother 76    Other Sister     Other Brother     No Known Problems Daughter     Heart failure Father     Stroke Father     No Known Problems Maternal Grandmother     No Known Problems Maternal Grandfather     No Known Problems Paternal Grandmother     No Known Problems Paternal Grandfather     No Known Problems Maternal Aunt     No Known Problems Maternal Aunt        Social History:     Social History     Socioeconomic History    Marital status: /Civil Union     Spouse name: Not on file    Number of children: Not on file  Years of education: Not on file    Highest education level: Not on file   Occupational History    Not on file   Tobacco Use    Smoking status: Never Smoker    Smokeless tobacco: Never Used   Vaping Use    Vaping Use: Never used   Substance and Sexual Activity    Alcohol use: Yes     Alcohol/week: 5 0 standard drinks     Types: 5 Glasses of wine per week    Drug use: No    Sexual activity: Not on file   Other Topics Concern    Not on file   Social History Narrative    Not on file     Social Determinants of Health     Financial Resource Strain: Not on file   Food Insecurity: Not on file   Transportation Needs: Not on file   Physical Activity: Not on file   Stress: Not on file   Social Connections: Not on file   Intimate Partner Violence: Not on file   Housing Stability: Not on file        I have reviewed the patient's medical, social and surgical history as well as medications in detail and updated the computerized patient record  Objective:   Physical Exam:                                                                   Vitals:            /66 (BP Location: Right arm, Patient Position: Sitting, Cuff Size: Standard)   Pulse 80   Temp (!) 97 3 °F (36 3 °C) (Temporal)   Ht 5' 2" (1 575 m)   Wt 55 8 kg (123 lb)   BMI 22 50 kg/m²   BP Readings from Last 3 Encounters:   05/03/22 139/66   05/04/21 122/70   05/15/19 126/59     Pulse Readings from Last 3 Encounters:   05/03/22 80   05/04/21 82   05/15/19 92          CONSTITUTIONAL: Well developed, well nourished, well groomed  No dysmorphic features  Eyes:  EOM normal      Neck:  Normal ROM, neck supple  HEENT:  Normocephalic atraumatic  Chest:  Respirations regular and unlabored  Psychiatric:  Normal behavior and appropriate affect      MENTAL STATUS  Orientation: Alert and oriented x 3  Fund of knowledge: Intact  MOTOR (Upper and lower extremities)   Bulk/tone/abnormal movement: Normal muscle bulk and tone        COORDINATION Station/Gait: Normal baseline gait  Review of Systems:   Review of Systems  Constitutional: Negative  HENT: Negative  Eyes: Negative  Respiratory: Negative  Cardiovascular: Negative  Gastrointestinal: Negative  Endocrine: Negative  Genitourinary: Negative  Musculoskeletal: Positive for back pain and neck pain  Skin: Negative  Allergic/Immunologic: Negative  Neurological: Negative  Hematological: Negative  Psychiatric/Behavioral: Negative        I personally reviewed the ROS entered by the MA    I spent 22 minutes in face-to-face discussion regarding  the pathophysiology of her current symptoms and further plan, as well as counseling, educating, and coordinating the patient's care including pathogenesis of diagnosis, prognosis of diagnosis, diagnostic results, impression, and recommendations, risks and benefits of treatment, instructions for disease self management, treatment instructions and follow up requirements and spent 20 minutes non-face to face    Author:  Kelsy Davenport PA-C 5/3/2022 9:46 AM

## 2022-05-03 NOTE — PROGRESS NOTES
Review of Systems   Constitutional: Negative  HENT: Negative  Eyes: Negative  Respiratory: Negative  Cardiovascular: Negative  Gastrointestinal: Negative  Endocrine: Negative  Genitourinary: Negative  Musculoskeletal: Positive for back pain and neck pain  Skin: Negative  Allergic/Immunologic: Negative  Neurological: Negative  Hematological: Negative  Psychiatric/Behavioral: Negative

## 2022-12-06 ENCOUNTER — HOSPITAL ENCOUNTER (OUTPATIENT)
Dept: MAMMOGRAPHY | Facility: MEDICAL CENTER | Age: 72
Discharge: HOME | End: 2022-12-06

## 2022-12-06 VITALS — HEIGHT: 62 IN | BODY MASS INDEX: 22.08 KG/M2 | WEIGHT: 120 LBS

## 2022-12-06 DIAGNOSIS — Z12.31 ENCOUNTER FOR SCREENING MAMMOGRAM FOR MALIGNANT NEOPLASM OF BREAST: ICD-10-CM

## 2023-06-16 ENCOUNTER — OFFICE VISIT (OUTPATIENT)
Dept: NEUROLOGY | Facility: CLINIC | Age: 73
End: 2023-06-16
Payer: COMMERCIAL

## 2023-06-16 VITALS
DIASTOLIC BLOOD PRESSURE: 65 MMHG | HEART RATE: 83 BPM | WEIGHT: 121.2 LBS | SYSTOLIC BLOOD PRESSURE: 137 MMHG | HEIGHT: 62 IN | BODY MASS INDEX: 22.31 KG/M2 | TEMPERATURE: 97.7 F

## 2023-06-16 DIAGNOSIS — M54.12 CERVICAL RADICULOPATHY: Primary | ICD-10-CM

## 2023-06-16 DIAGNOSIS — R20.2 PARESTHESIAS: ICD-10-CM

## 2023-06-16 PROCEDURE — 99215 OFFICE O/P EST HI 40 MIN: CPT | Performed by: PHYSICIAN ASSISTANT

## 2023-06-16 RX ORDER — PREGABALIN 25 MG/1
25 CAPSULE ORAL AS NEEDED
Qty: 30 CAPSULE | Refills: 0 | Status: SHIPPED | OUTPATIENT
Start: 2023-06-16

## 2023-06-16 NOTE — PROGRESS NOTES
[facility]  PATIENT:  Marzena Yin  MRN:  [id number]  :  1950  DATE OF SERVICE:  2023      Assessment/Plan:     Cervical radiculopathy  Continue Nortriptyline 40 mg at bedtime  Continue Lexapro 5 mg per pcp    Use lyrica 25 mg at onset of your severe tingling and pain  May repeat in 8 hours if needed  Please call if not effective to increase the dose    Follow up in 1 year  Call if worsens         Problem List Items Addressed This Visit        Nervous and Auditory    Cervical radiculopathy - Primary     Continue Nortriptyline 40 mg at bedtime  Continue Lexapro 5 mg per pcp    Use lyrica 25 mg at onset of your severe tingling and pain  May repeat in 8 hours if needed  Please call if not effective to increase the dose    Follow up in 1 year  Call if worsens         Relevant Medications    pregabalin (LYRICA) 25 mg capsule       Other    Paresthesias    Relevant Medications    pregabalin (LYRICA) 25 mg capsule           History of Present Illness: We had the pleasure of evaluating Marzena Yin in neurological follow up  today for headaches  As you know,  she is a 68 y o   right handed female  She is a retired       Headaches have been well controlled  States that when she is outside during spring they worsen     Panic attack/anxiety:   PCP started her on Lexapro 5 mg  Bre Fernandez states that since she has been on Lexapro she has not had any chest tightness   She does still get some panic attacks but they have decreased in frequency significantly they may occur 1 a month--not full panic attacks but more anxiety due to COVID etc   Able to let it go more easily  As of 23 this has improved       Left arm pain:  no new concerns  She now feels that she is better    Patient states this has resolved     Paresthesia in her limbs:   Patient underwent left knee arthroscopy and had an episode of syncope the next day   Patient did fall and had head injury (stephen)   Did have worsening for a few weeks after this   MOst recently is her jaw  This is worse in the evening for her  Almost feels like a throbbing in jaw line bilaterally and into cheeks  Pain is 2/10  Happens 1 time every week for the past few months  Alexander Arellano also happen if she does any physical activity        She travels every other weekend, 4 hours each way to care for her 3 young grandchildren  Merian Fall she gets home she feels the most pain and the most fatigue   However, patient feels overall her neuropathy and paresthesias are stable  This will be changing as her daughter will be moving to [place]           Cervicalgia 4/10  Comes and goes, lasts maybe 1 hour  When her neck pain is bad, her jaw bilaterally is bothersome and then down her arms, describes a tight tingling which disappears with the resolution of pain      Will notice tingling in her legs when she lays down    This is on occasion        Trigger: Finds that fatigue and anxiety will trigger her symptoms       EM  Showed mild to moderated CTS and Left mild C 7 radiculopathy:   Neck pain: Saw pain management and had epidural injection which affected her sleep     2017 MRI c spine  Multilevel degenerative spondylosis, mild central canal stenosis, moderate to severe bilateral foraminal stenosis C3-C4, C4-5 and C5-6     2017 MRI t spine  Mild multilevel degenerative spondylosis   No evidence of disc herniation, central canal or foraminal stenosis   Normal appearance thoracic spinal cord   Probable mild atherosclerotic wall thickening throughout the normal caliber thoracic aorta     I  personally reviewed these images     Have you used CBD or THC for your headaches and how often? No     Reviewed old notes from physician seen in the past- see above HPI for summary of previous encounters           Past Medical History:   Diagnosis Date   • Anxiety    • Cervical radiculopathy at C7    • Concussion 2020   • Dizziness Patient Active Problem List   Diagnosis   • Paresthesias   • Cervical radiculopathy   • Anxiety disorder   • Vitamin D deficiency   • Vitamin B12 deficiency   • Chronic migraine without aura without status migrainosus, not intractable       Medications:      Current Outpatient Medications   Medication Sig Dispense Refill   • amLODIPine (NORVASC) 5 mg tablet Take 5 mg by mouth daily     • Apoaequorin (PREVAGEN PO) Take 1 tablet by mouth in the morning     • Biotin 10 MG CAPS Take 10 mg by mouth in the morning     • Calcium Carbonate-Vitamin D (CALTRATE 600+D) 600-400 MG-UNIT per tablet Take 1 tablet by mouth 2 (two) times a day      • cycloSPORINE (RESTASIS) 0 05 % ophthalmic emulsion Administer 1 drop to both eyes 2 (two) times a day     • escitalopram (LEXAPRO) 20 mg tablet Take 20 mg by mouth daily     • ezetimibe (ZETIA) 10 mg tablet Take 10 mg by mouth daily     • Magnesium 500 MG CAPS Take 500 mg by mouth daily       • Multiple Vitamins-Minerals (CENTRUM SILVER PO) Take 1 tablet by mouth daily       • NON FORMULARY Take 1 tablet by mouth 2 (two) times a day Focus     • nortriptyline (PAMELOR) 10 mg capsule Take 4 capsules (40 mg total) by mouth daily at bedtime 360 capsule 3   • pregabalin (LYRICA) 25 mg capsule Take 1 capsule (25 mg total) by mouth if needed (numbness and tingling) 30 capsule 0   • Probiotic Product (PROBIOTIC PO) Take 1 tablet by mouth in the morning     • TURMERIC PO Take 2 tablets by mouth in the morning       No current facility-administered medications for this visit  Allergies:       Allergies   Allergen Reactions   • Aspirin GI Intolerance     Other reaction(s): Abdominal Discomfort, GI Intolerance     • Gabapentin Other (See Comments) and Dizziness     disoriented   • Motrin [Ibuprofen] Other (See Comments)     Sweating, arm pain   • Prednisone Other (See Comments)     Patient states she has problems with retina - was told by eye doctor Dr Rao Snyder that she cannot have "prednisone        Family History:     Family History   Problem Relation Age of Onset   • Breast cancer Mother 76   • Other Sister    • Other Brother    • No Known Problems Daughter    • Heart failure Father    • Stroke Father    • No Known Problems Maternal Grandmother    • No Known Problems Maternal Grandfather    • No Known Problems Paternal Grandmother    • No Known Problems Paternal Grandfather    • No Known Problems Maternal Aunt    • No Known Problems Maternal Aunt        Social History:     Social History     Socioeconomic History   • Marital status: /Civil Union     Spouse name: Not on file   • Number of children: Not on file   • Years of education: Not on file   • Highest education level: Not on file   Occupational History   • Not on file   Tobacco Use   • Smoking status: Never   • Smokeless tobacco: Never   Vaping Use   • Vaping Use: Never used   Substance and Sexual Activity   • Alcohol use: Not Currently     Comment: Socially   • Drug use: No   • Sexual activity: Not on file   Other Topics Concern   • Not on file   Social History Narrative   • Not on file     Social Determinants of Health     Financial Resource Strain: Not on file   Food Insecurity: Not on file   Transportation Needs: Not on file   Physical Activity: Not on file   Stress: Not on file   Social Connections: Not on file   Intimate Partner Violence: Not on file   Housing Stability: Not on file      I have reviewed the patient's medical, social and surgical history as well as medications in detail and updated the computerized patient record        Objective:   Physical Exam:                                                                   Vitals:            /65 (BP Location: Left arm, Patient Position: Sitting, Cuff Size: Standard)   Pulse 83   Temp 97 7 °F (36 5 °C) (Temporal)   Ht 5' 2\" (1 575 m)   Wt 55 kg (121 lb 3 2 oz)   BMI 22 17 kg/m²   BP Readings from Last 3 Encounters:   06/16/23 137/65   05/03/22 139/66 " 05/04/21 122/70     Pulse Readings from Last 3 Encounters:   06/16/23 83   05/03/22 80   05/04/21 82          CONSTITUTIONAL: Well developed, well nourished, well groomed  No dysmorphic features  Eyes:  EOM normal      Neck:  Normal ROM, neck supple  HEENT:  Normocephalic atraumatic  Chest:  Respirations regular and unlabored  Psychiatric:  Normal behavior and appropriate affect      MENTAL STATUS  Orientation: Alert and oriented x 3  Fund of knowledge: Intact  MOTOR (Upper and lower extremities)   Bulk/tone/abnormal movement: Normal muscle bulk and tone  COORDINATION   Station/Gait: Normal baseline gait  Review of Systems:   Review of Systems  Constitutional: Negative  HENT: Negative  Eyes: Negative  Respiratory: Negative  Cardiovascular: Negative  Gastrointestinal: Negative  Endocrine: Negative  Genitourinary: Negative  Musculoskeletal: Negative  Skin: Negative  Allergic/Immunologic: Negative  Neurological: Negative  Hematological: Negative  Psychiatric/Behavioral: Negative  I personally reviewed the ROS entered by the MA      I have spent a total time of 40 minutes on 06/16/23 in caring for this patient including Prognosis, Risks and benefits of tx options, Instructions for management, Patient and family education, Risk factor reductions, Impressions, Counseling / Coordination of care, Documenting in the medical record, Reviewing / ordering tests, medicine, procedures   and Obtaining or reviewing history          Author:  Jesus Alberto Costello PA-C 6/16/2023 12:26 PM

## 2023-06-16 NOTE — PATIENT INSTRUCTIONS
Continue Nortriptyline 40 mg at bedtime  Continue Lexapro 5 mg per pcp    Use lyrica 25 mg at onset of your severe tingling and pain  May repeat in 8 hours if needed    Please call if not effective to increase the dose    Follow up in 1 year  Call if worsens

## 2023-12-08 ENCOUNTER — HOSPITAL ENCOUNTER (OUTPATIENT)
Dept: MAMMOGRAPHY | Facility: MEDICAL CENTER | Age: 73
Discharge: HOME | End: 2023-12-08
Payer: COMMERCIAL

## 2023-12-08 ENCOUNTER — HOSPITAL ENCOUNTER (OUTPATIENT)
Dept: BONE DENSITY | Facility: MEDICAL CENTER | Age: 73
Discharge: HOME | End: 2023-12-08
Payer: COMMERCIAL

## 2023-12-08 VITALS — WEIGHT: 121 LBS | HEIGHT: 62 IN | BODY MASS INDEX: 22.26 KG/M2

## 2023-12-08 DIAGNOSIS — Z12.31 ENCOUNTER FOR SCREENING MAMMOGRAM FOR MALIGNANT NEOPLASM OF BREAST: ICD-10-CM

## 2023-12-08 DIAGNOSIS — Z13.820 OSTEOPOROSIS SCREENING: ICD-10-CM

## 2023-12-08 PROCEDURE — 77080 DXA BONE DENSITY AXIAL: CPT

## 2023-12-08 PROCEDURE — 77063 BREAST TOMOSYNTHESIS BI: CPT

## 2023-12-08 PROCEDURE — 77067 SCR MAMMO BI INCL CAD: CPT

## 2023-12-18 ENCOUNTER — TELEPHONE (OUTPATIENT)
Dept: NEUROLOGY | Facility: CLINIC | Age: 73
End: 2023-12-18

## 2023-12-18 NOTE — TELEPHONE ENCOUNTER
Received VM transcription (10:25 AM):    Good morning, this is Rigoberto Baker calling. My YOB: 1950. This is directed for Omayra Tran. I just want to let her know that I've really been in pain with my neck these past 4 or 5 days. And, at our last visit, which was 6 months ago. We omayra mentioned having an MRI done just to do an update. I was just wondering if she could give me some direction with this. And I will wait for your call back. All right, thank you very much.

## 2023-12-19 NOTE — TELEPHONE ENCOUNTER
I would have to see her for a visit to get updated imaging on her neck.  We could try a muscle relaxer to see if that helps with the neck pain.

## 2023-12-21 NOTE — TELEPHONE ENCOUNTER
Called [phone] and left a detailed message on pt's answering machine of the below and for a call back.

## 2023-12-22 DIAGNOSIS — M54.12 CERVICAL RADICULOPATHY: Primary | ICD-10-CM

## 2023-12-22 RX ORDER — TIZANIDINE 2 MG/1
2 TABLET ORAL
Qty: 30 TABLET | Refills: 3 | Status: SHIPPED | OUTPATIENT
Start: 2023-12-22

## 2023-12-22 NOTE — TELEPHONE ENCOUNTER
Called pt and advised of the below. She verbalized understanding. States that she takes lyrica 25 mg prn. She only takes it if she really needs it. She is agreeable to try a muscle relaxer and or increase lyrica dosage.

## 2023-12-22 NOTE — TELEPHONE ENCOUNTER
Patient returning call from Nurse. Scheduled appt on 12/29/23 at 1230 w/[facility] for neck pain and to discuss MRI.

## 2023-12-22 NOTE — TELEPHONE ENCOUNTER
Ibteh Hernandez PA-C   to Me       12/22/23 12:09 PM  Tizanidine 2mg at bedtime sent to her pharmacy. Medication can cause fatigue. If not effective it can be increased. Please call the office in 1 week with an update. Thank you.

## 2023-12-29 ENCOUNTER — OFFICE VISIT (OUTPATIENT)
Dept: NEUROLOGY | Facility: CLINIC | Age: 73
End: 2023-12-29
Payer: COMMERCIAL

## 2023-12-29 VITALS
SYSTOLIC BLOOD PRESSURE: 143 MMHG | HEART RATE: 77 BPM | HEIGHT: 62 IN | BODY MASS INDEX: 23.04 KG/M2 | WEIGHT: 125.2 LBS | DIASTOLIC BLOOD PRESSURE: 67 MMHG | TEMPERATURE: 98.3 F

## 2023-12-29 DIAGNOSIS — R20.0 NUMBNESS AND TINGLING OF BOTH UPPER EXTREMITIES: ICD-10-CM

## 2023-12-29 DIAGNOSIS — R29.1 NUCHAL RIGIDITY: ICD-10-CM

## 2023-12-29 DIAGNOSIS — R29.898 DECREASED ROM OF NECK: ICD-10-CM

## 2023-12-29 DIAGNOSIS — R29.898 WEAKNESS OF BOTH ARMS: ICD-10-CM

## 2023-12-29 DIAGNOSIS — M54.12 CERVICAL RADICULOPATHY: ICD-10-CM

## 2023-12-29 DIAGNOSIS — G43.709 CHRONIC MIGRAINE WITHOUT AURA WITHOUT STATUS MIGRAINOSUS, NOT INTRACTABLE: ICD-10-CM

## 2023-12-29 DIAGNOSIS — R20.2 PARESTHESIAS: ICD-10-CM

## 2023-12-29 DIAGNOSIS — R13.0 INABILITY TO SWALLOW: Primary | ICD-10-CM

## 2023-12-29 DIAGNOSIS — R20.2 NUMBNESS AND TINGLING OF BOTH UPPER EXTREMITIES: ICD-10-CM

## 2023-12-29 PROCEDURE — 99215 OFFICE O/P EST HI 40 MIN: CPT | Performed by: PHYSICIAN ASSISTANT

## 2023-12-29 RX ORDER — LIFITEGRAST 50 MG/ML
1 SOLUTION/ DROPS OPHTHALMIC 2 TIMES DAILY
COMMUNITY
Start: 2023-10-19

## 2023-12-29 NOTE — PROGRESS NOTES
Review of Systems   Constitutional: Negative.    HENT: Negative.     Eyes: Negative.    Respiratory: Negative.     Cardiovascular: Negative.    Gastrointestinal: Negative.    Endocrine: Negative.    Genitourinary: Negative.    Musculoskeletal:  Positive for neck pain.   Skin: Negative.    Allergic/Immunologic: Negative.    Neurological:  Positive for headaches.   Hematological: Negative.    Psychiatric/Behavioral: Negative.

## 2023-12-29 NOTE — PROGRESS NOTES
[facility]  PATIENT:  Charlotte Baker  MRN:  [id number]  :  1950  DATE OF SERVICE:  2023      Assessment/Plan:     Inability to swallow  Due to sudden onset and multitude of systems which patient had affected starting on 2023 will proceed with MRI of the C-spine.  She did have a history of MRI of the C-spine in 2017 with significant severe bilateral foramen stenosis at multiple levels, mild central canal stenosis as well has multiple bilateral nerve root encroachment at that time.  However, this was sudden onset in nature as well as new onset of inability to swallow, nuchal rigidity, decreased range of motion of the neck, weakness in her upper extremities and tingling in her shoulders and upper back.  She is in no distress today and her exam has stabilized with the exception of tingling still present in her upper back as well as left-sided hyperreflexia    Chronic migraine without aura without status migrainosus, not intractable  Headaches have been stable.  Continue current medications as prescribed         Problem List Items Addressed This Visit        Cardiovascular and Mediastinum    Chronic migraine without aura without status migrainosus, not intractable     Headaches have been stable.  Continue current medications as prescribed            Nervous and Auditory    Cervical radiculopathy       Other    Paresthesias    Relevant Orders    MRI cervical spine with and without contrast    Weakness of both arms    Relevant Orders    MRI cervical spine with and without contrast    Numbness and tingling of both upper extremities    Relevant Orders    MRI cervical spine with and without contrast    Decreased ROM of neck    Relevant Orders    MRI cervical spine with and without contrast    Inability to swallow - Primary     Due to sudden onset and multitude of systems which patient had affected starting on 2023 will proceed with MRI of the C-spine.  She  did have a history of MRI of the C-spine in 2017 with significant severe bilateral foramen stenosis at multiple levels, mild central canal stenosis as well has multiple bilateral nerve root encroachment at that time.  However, this was sudden onset in nature as well as new onset of inability to swallow, nuchal rigidity, decreased range of motion of the neck, weakness in her upper extremities and tingling in her shoulders and upper back.  She is in no distress today and her exam has stabilized with the exception of tingling still present in her upper back as well as left-sided hyperreflexia         Relevant Orders    MRI cervical spine with and without contrast    Nuchal rigidity    Relevant Orders    MRI cervical spine with and without contrast           History of Present Illness:   We had the pleasure of evaluating Charlotte Baker in neurological follow up  today for headaches.  As you know,  she is a 73 y.o.  right handed female. She is a retired .     Headaches have been well controlled.  States that when the weather is bad her headaches are worse.  She did have headaches with the significant neck pain but no new headache symptoms with that pain.       NEW SEVERE NECK:  Pain is 3/10  ON 12/13/2023 COVID and RSV vaccine.  Did have 3 days of pain which exacerbated on the 3rd day.  She had difficulty turning her head with what she described to be nuchal rigidity.  In addition to the nuchal rigidity, patient was unable to swallow her pills which was new for her.  This lasted for multiple days.  Pain was 10/10 on those days. Did begin to have heaviness in shoulders and tingling in her mid-back which still continues to this day.    Did have trouble (unable to lift her arms over her head to do her hair). With both arms.  This is new for her.   She did have some facial swelling, pain and numbness during this timeframe as well.  Is having more tingling in her neck and face over the past few  weeks.  Pain did decrease to a 6/10.  Patient did start the lyrica during the course of this pain.  She uses this as needed.  She also took some tylenol during this episode.  Patient didn't start he tizanidine yet as there was a delay obtaining the medication.    Patient does neck exercises at least 2-4 times a week and has been doing this since .  Has not had improvement with this.  Did have PT in the past where she learned the neck exercises and did not notice any benefit with this for her pain.  However, this episode listed about was worse than before.    Prior neck history:  comes and goes with pain up to 6/10 normally. Comes and goes, lasts maybe 1 hour and then decreases to her baseline pain.  When her neck pain is bad, her jaw bilaterally is bothersome and then down her arms, describes a tight tingling which disappears with the resolution of pain      Will notice tingling in her legs when she lays down.  This is on occasion.       Trigger: Finds that fatigue and anxiety will trigger her symptoms.      EM  Showed mild to moderated CTS and Left mild C 7 radiculopathy:   Neck pain: Saw pain management and had epidural injection which affected her sleep     2017 MRI c spine   Moderate to severe degenerative disc disease, moderate degenerative facet/uncinate process arthrosis, grade 1 retrolisthesis, mild central canal stenosis, moderate to severe bilateral foraminal stenosis, probable bilateral C4 nerve root   encroachment     C4-C5:  Moderate to severe degenerative disc disease, moderate degenerative facet/uncinate process arthrosis, mild central canal stenosis, moderate bilateral foraminal stenosis, possible bilateral C5 nerve root encroachment     C5-C6:  Moderate to severe degenerative disc disease, moderate degenerative facet/uncinate process arthrosis, mild central canal stenosis, moderate bilateral foraminal stenosis, possible bilateral C6 nerve root encroachment     C6-C7:  Mild  degenerative spondylosis, no stenosis     C7-T1:  Normal.     UPPER THORACIC DISC SPACES:  Normal.     Multilevel degenerative spondylosis, mild central canal stenosis, moderate to severe bilateral foraminal stenosis C3-C4, C4-5 and C5-6     8/2/2017 MRI t spine  Mild multilevel degenerative spondylosis.  No evidence of disc herniation, central canal or foraminal stenosis.  Normal appearance thoracic spinal cord.  Probable mild atherosclerotic wall thickening throughout the normal caliber thoracic aorta     I  personally reviewed these images     Have you used CBD or THC for your headaches and how often? No     Panic attack/anxiety:   PCP started her on Lexapro 5 mg.  She states that since she has been on Lexapro she has not had any chest tightness.  She does still get some panic attacks but they have decreased in frequency significantly they may occur 1 a month--not full panic attacks but more anxiety due to COVID etc.  Able to let it go more easily  As of 6/16/23 this has improved       Left arm pain:  no new concerns. She now feels that she is better.  Patient states this has resolved     Paresthesia in her limbs:   Patient underwent left knee arthroscopy and had an episode of syncope the next day.  Patient did fall and had head injury (stitches).  Did have worsening for a few weeks after this.  MOst recently is her jaw.  This is worse in the evening for her.  Almost feels like a throbbing in jaw line bilaterally and into cheeks.  Pain is 2/10.  Happens 1 time every week for the past few months.  Can also happen if she does any physical activity.       Reviewed old notes from physician seen in the past- see above HPI for summary of previous encounters.       Past Medical History:   Diagnosis Date   • Anxiety    • Cervical radiculopathy at C7    • Concussion 05/20/2020   • CTS (carpal tunnel syndrome) 2000   • Dizziness    • Head injury 5/2020    concussion   • Hypertension 2016   • Syncope 5/2020    day after  anesthesia       Patient Active Problem List   Diagnosis   • Paresthesias   • Cervical radiculopathy   • Anxiety disorder   • Vitamin D deficiency   • Vitamin B12 deficiency   • Chronic migraine without aura without status migrainosus, not intractable   • Weakness of both arms   • Numbness and tingling of both upper extremities   • Decreased ROM of neck   • Inability to swallow   • Nuchal rigidity       Medications:      Current Outpatient Medications   Medication Sig Dispense Refill   • amLODIPine (NORVASC) 5 mg tablet Take 5 mg by mouth daily     • Apoaequorin (PREVAGEN PO) Take 1 tablet by mouth in the morning     • Biotin 10 MG CAPS Take 10 mg by mouth in the morning     • Calcium Carbonate-Vitamin D (CALTRATE 600+D) 600-400 MG-UNIT per tablet Take 1 tablet by mouth 2 (two) times a day      • escitalopram (LEXAPRO) 20 mg tablet Take 20 mg by mouth daily     • ezetimibe (ZETIA) 10 mg tablet Take 10 mg by mouth daily     • lifitegrast (Xiidra) 5 % op solution Administer 1 drop into ears 2 (two) times a day     • Magnesium 500 MG CAPS Take 500 mg by mouth daily       • Multiple Vitamins-Minerals (CENTRUM SILVER PO) Take 1 tablet by mouth daily       • NON FORMULARY Take 1 tablet by mouth 2 (two) times a day Focus     • nortriptyline (PAMELOR) 10 mg capsule Take 4 capsules (40 mg total) by mouth daily at bedtime 360 capsule 3   • pregabalin (LYRICA) 25 mg capsule Take 1 capsule (25 mg total) by mouth if needed (numbness and tingling) 30 capsule 0   • Probiotic Product (PROBIOTIC PO) Take 1 tablet by mouth in the morning     • tiZANidine (ZANAFLEX) 2 mg tablet Take 1 tablet (2 mg total) by mouth daily at bedtime (Patient taking differently: Take 2 mg by mouth if needed) 30 tablet 3   • TURMERIC PO Take 2 tablets by mouth in the morning       No current facility-administered medications for this visit.        Allergies:      Allergies   Allergen Reactions   • Aspirin GI Intolerance     Other reaction(s): Abdominal  Discomfort, GI Intolerance     • Gabapentin Other (See Comments) and Dizziness     disoriented   • Motrin [Ibuprofen] Other (See Comments)     Sweating, arm pain   • Prednisone Other (See Comments)     Patient states she has problems with retina - was told by eye doctor Dr. Chadwick that she cannot have prednisone        Family History:     Family History   Problem Relation Age of Onset   • Breast cancer Mother 68   • Dementia Mother    • Other Sister    • Other Brother    • No Known Problems Daughter    • Heart failure Father    • Stroke Father    • No Known Problems Maternal Grandmother    • No Known Problems Maternal Grandfather    • No Known Problems Paternal Grandmother    • No Known Problems Paternal Grandfather    • No Known Problems Maternal Aunt    • No Known Problems Maternal Aunt    • Parkinsonism Brother        Social History:     Social History     Socioeconomic History   • Marital status: /Civil Union     Spouse name: Not on file   • Number of children: Not on file   • Years of education: Not on file   • Highest education level: Not on file   Occupational History   • Not on file   Tobacco Use   • Smoking status: Never   • Smokeless tobacco: Never   Vaping Use   • Vaping status: Never Used   Substance and Sexual Activity   • Alcohol use: Yes     Alcohol/week: 7.0 standard drinks of alcohol     Types: 4 Glasses of wine, 3 Cans of beer per week     Comment: Socially   • Drug use: No   • Sexual activity: Not on file   Other Topics Concern   • Not on file   Social History Narrative   • Not on file     Social Determinants of Health     Financial Resource Strain: Low Risk  (11/28/2023)    Received from [facility]    Overall Financial Resource Strain (CARDIA)    • Difficulty of Paying Living Expenses: Not hard at all   Food Insecurity: No Food Insecurity (11/28/2023)    Received from [facility]    Hunger Vital Sign    • Worried About Running Out of Food in the Last  Year: Never true    • Ran Out of Food in the Last Year: Never true   Transportation Needs: No Transportation Needs (11/28/2023)    Received from [facility]    PRAPARE - Transportation    • Lack of Transportation (Medical): No    • Lack of Transportation (Non-Medical): No   Physical Activity: Sufficiently Active (11/28/2023)    Received from [facility]    Exercise Vital Sign    • Days of Exercise per Week: 5 days    • Minutes of Exercise per Session: 40 min   Stress: Stress Concern Present (11/28/2023)    Received from [facility]    [employer] - Occupational Stress Questionnaire    • Feeling of Stress : To some extent   Social Connections: Socially Integrated (11/28/2023)    Received from [facility]    Social Connection and Isolation Panel [NHANES]    • Frequency of Communication with Friends and Family: Twice a week    • Frequency of Social Gatherings with Friends and Family: Twice a week    • Attends Anabaptism Services: More than 4 times per year    • Active Member of Clubs or Organizations: Yes    • Attends Club or Organization Meetings: 1 to 4 times per year    • Marital Status:    Intimate Partner Violence: Not At Risk (11/28/2023)    Received from [facility]    Humiliation, Afraid, Rape, and Kick questionnaire    • Fear of Current or Ex-Partner: No    • Emotionally Abused: No    • Physically Abused: No    • Sexually Abused: No   Housing Stability: Low Risk  (11/28/2023)    Received from [facility]    Housing Stability Vital Sign    • Unable to Pay for Housing in the Last Year: No    • Number of Places Lived in the Last Year: 1    • Unstable Housing in the Last Year: No         I have reviewed the patient's medical, social and surgical history as well as medications in detail and updated the computerized patient record.        Objective:   Physical Exam:                  "                                                  Vitals:            /67 (BP Location: Left arm, Patient Position: Sitting, Cuff Size: Standard)   Pulse 77   Temp 98.3 °F (36.8 °C) (Temporal)   Ht 5' 2\" (1.575 m)   Wt 56.8 kg (125 lb 3.2 oz)   BMI 22.90 kg/m²   BP Readings from Last 3 Encounters:   12/29/23 143/67   06/16/23 137/65   05/03/22 139/66     Pulse Readings from Last 3 Encounters:   12/29/23 77   06/16/23 83   05/03/22 80          CONSTITUTIONAL: Well developed, well nourished, well groomed. No dysmorphic features.     Eyes:  PERRLA, EOM normal      Neck:  markedly decreased ROM, neck supple.      HEENT:  Normocephalic atraumatic. Oropharynx is moist.   Chest:  Respirations regular and unlabored.    Cardiovascular:  Distal extremities warm  no observed significant swelling.    Musculoskeletal:  Full range of motion.  (see below under neurologic exam for evaluation of motor function and gait)   Skin:  warm and dry   Psychiatric:  Normal behavior and appropriate affect      SKULL AND SPINE  ROM: decreased rom  Tenderness: No focal tenderness  Paravertebral Muscles: normal    MENTAL STATUS  Orientation: Alert and oriented x 3  Fund of knowledge: Intact.    CRANIAL NERVES  II: PERRL.  Visual fields full.  III, IV, VI: Extraocular movements intact.  No nystagmus.  V: Facial sensation normal V1-V3  VII: Facial movements normal and symmetric  VIII: Intact hearing bilaterally  IX, X: Palate elevation normal and symmetric  XI: Intact trapezius, SCM strength  XII: Tongue protrudes to the midline    MOTOR (Upper and lower extremities)   Bulk/tone/abnormal movement: Normal muscle bulk and tone.  Drift: No pronator drift.  Strength: Strength 5/5 throughout.      COORDINATION   F/N: normal with slight left past point  FFM: normal  Station/Gait: Normal baseline gait. Normal tandem gait.      SENSORY  Temperature: normal  Vibration: normal  Light touch: normal  Romberg sign absent.    Reflexes:  deep tendon " reflexes are 4+/4 upper and lower on left and 3+/4 on the right, no clonus       Review of Systems:   Review of Systems   Constitutional:  Positive for fatigue.   HENT:  Positive for trouble swallowing.    Eyes: Negative.    Respiratory: Negative.     Cardiovascular: Negative.    Gastrointestinal: Negative.    Endocrine: Negative.    Genitourinary: Negative.    Musculoskeletal:  Positive for myalgias, neck pain and neck stiffness.   Skin: Negative.    Allergic/Immunologic: Negative.    Neurological:  Positive for facial asymmetry, weakness, numbness and headaches.   Hematological: Negative.    Psychiatric/Behavioral: Negative.         I personally reviewed the ROS entered by the MA      I have spent a total time of 60 minutes on 12/29/23 in caring for this patient including Prognosis, Risks and benefits of tx options, Instructions for management, Patient and family education, Importance of tx compliance, Risk factor reductions, Impressions, Counseling / Coordination of care, Documenting in the medical record, Reviewing / ordering tests, medicine, procedures  , and Obtaining or reviewing history  .      Author:  Omayra Tran PA-C 12/29/2023 3:11 PM

## 2023-12-29 NOTE — ASSESSMENT & PLAN NOTE
Due to sudden onset and multitude of systems which patient had affected starting on December 14, 2023 will proceed with MRI of the C-spine.  She did have a history of MRI of the C-spine in 2017 with significant severe bilateral foramen stenosis at multiple levels, mild central canal stenosis as well has multiple bilateral nerve root encroachment at that time.  However, this was sudden onset in nature as well as new onset of inability to swallow, nuchal rigidity, decreased range of motion of the neck, weakness in her upper extremities and tingling in her shoulders and upper back.  She is in no distress today and her exam has stabilized with the exception of tingling still present in her upper back as well as left-sided hyperreflexia

## 2024-02-02 ENCOUNTER — HOSPITAL ENCOUNTER (OUTPATIENT)
Dept: MRI IMAGING | Facility: HOSPITAL | Age: 74
Discharge: HOME | End: 2024-02-02
Payer: COMMERCIAL

## 2024-02-02 DIAGNOSIS — R20.0 NUMBNESS AND TINGLING OF BOTH UPPER EXTREMITIES: ICD-10-CM

## 2024-02-02 DIAGNOSIS — R13.0 INABILITY TO SWALLOW: ICD-10-CM

## 2024-02-02 DIAGNOSIS — R29.898 WEAKNESS OF BOTH ARMS: ICD-10-CM

## 2024-02-02 DIAGNOSIS — R20.2 NUMBNESS AND TINGLING OF BOTH UPPER EXTREMITIES: ICD-10-CM

## 2024-02-02 DIAGNOSIS — R20.2 PARESTHESIAS: ICD-10-CM

## 2024-02-02 DIAGNOSIS — R29.898 DECREASED ROM OF NECK: ICD-10-CM

## 2024-02-02 DIAGNOSIS — R29.1 NUCHAL RIGIDITY: ICD-10-CM

## 2024-02-02 PROCEDURE — 72156 MRI NECK SPINE W/O & W/DYE: CPT

## 2024-02-02 PROCEDURE — A9585 GADOBUTROL INJECTION: HCPCS | Performed by: PHYSICIAN ASSISTANT

## 2024-02-02 PROCEDURE — G1004 CDSM NDSC: HCPCS

## 2024-02-02 RX ORDER — GADOBUTROL 604.72 MG/ML
6 INJECTION INTRAVENOUS
Status: COMPLETED | OUTPATIENT
Start: 2024-02-02 | End: 2024-02-02

## 2024-02-02 RX ADMIN — GADOBUTROL 6 ML: 604.72 INJECTION INTRAVENOUS at 08:04

## 2024-02-06 ENCOUNTER — PATIENT MESSAGE (OUTPATIENT)
Dept: NEUROLOGY | Facility: CLINIC | Age: 74
End: 2024-02-06

## 2024-02-06 DIAGNOSIS — G43.709 CHRONIC MIGRAINE WITHOUT AURA WITHOUT STATUS MIGRAINOSUS, NOT INTRACTABLE: ICD-10-CM

## 2024-02-07 RX ORDER — NORTRIPTYLINE HYDROCHLORIDE 10 MG/1
40 CAPSULE ORAL
Qty: 360 CAPSULE | Refills: 3 | Status: SHIPPED | OUTPATIENT
Start: 2024-02-07

## 2024-02-08 ENCOUNTER — TELEPHONE (OUTPATIENT)
Dept: NEUROLOGY | Facility: CLINIC | Age: 74
End: 2024-02-08

## 2024-02-08 NOTE — TELEPHONE ENCOUNTER
----- Message from Omayra Tran PA-C sent at 2/7/2024  3:41 PM EST -----  Please call the patient regarding her abnormal result.    Spondylotic degenerative changes are seen particularly at C3-4 and C4-5 where mild to moderate central stenosis is noted with moderate left foraminal narrowing at C3-4 and moderate right foraminal narrowing at C4-5.     Moderate right foraminal narrowing noted at C5-6 with mild central stenosis. No cord compression or cord signal abnormality.    Appears to be stable per radiology review.  Can refer her to pain management or spine surgeon if she wants

## 2024-02-08 NOTE — TELEPHONE ENCOUNTER
Called and advised pt of the below. She verbalized understanding. Pt declined PM and spine surgeon at this time. She will just deal w/ it right now. If it gets worse then she will call us back.     domo

## 2024-06-17 ENCOUNTER — OFFICE VISIT (OUTPATIENT)
Dept: NEUROLOGY | Facility: CLINIC | Age: 74
End: 2024-06-17
Payer: COMMERCIAL

## 2024-06-17 VITALS
HEIGHT: 62 IN | SYSTOLIC BLOOD PRESSURE: 143 MMHG | HEART RATE: 84 BPM | TEMPERATURE: 98.4 F | BODY MASS INDEX: 22.86 KG/M2 | DIASTOLIC BLOOD PRESSURE: 68 MMHG | WEIGHT: 124.2 LBS

## 2024-06-17 DIAGNOSIS — M54.12 CERVICAL RADICULOPATHY: ICD-10-CM

## 2024-06-17 DIAGNOSIS — G43.709 CHRONIC MIGRAINE WITHOUT AURA WITHOUT STATUS MIGRAINOSUS, NOT INTRACTABLE: Primary | ICD-10-CM

## 2024-06-17 PROCEDURE — 99214 OFFICE O/P EST MOD 30 MIN: CPT | Performed by: PHYSICIAN ASSISTANT

## 2024-06-17 NOTE — PATIENT INSTRUCTIONS
Continue Nortriptyline 40 mg at bedtime  Continue Lexapro 5 mg per pcp    Use lyrica 25 mg at onset of your severe tingling and pain.  May repeat in 8 hours if needed.  Please call if not effective to increase the dose    Follow up in 1 year  Call if worsens

## 2024-06-17 NOTE — PROGRESS NOTES
Ambulatory Visit  Name: Charlotte Baker      : 1950      MRN: [id number]  Encounter Provider: Omayra Tran PA-C  Encounter Date: 2024   Encounter department: NEUROLOGY [facility]    Assessment & Plan   1. Chronic migraine without aura without status migrainosus, not intractable  Assessment & Plan:  Continue Nortriptyline 40 mg at bedtime  Continue Lexapro 5 mg per pcp    Use lyrica 25 mg at onset of your severe tingling and pain.  May repeat in 8 hours if needed.  Please call if not effective to increase the dose    Follow up in 1 year  Call if worsens  2. Cervical radiculopathy  Assessment & Plan:  Continue Nortriptyline 40 mg at bedtime  Continue Lexapro 5 mg per pcp    Use lyrica 25 mg at onset of your severe tingling and pain.  May repeat in 8 hours if needed.  Please call if not effective to increase the dose    Follow up in 1 year  Call if worsens      History of Present Illness     Charlotte Baker is a 74 y.o. female who presents for follow up on her neck pain and headaches    She is a retired .     Headaches have been well controlled.  States that when the weather is bad her headaches are worse.  Wakes up with the headaches most morning during change of season.  Otherwise headaches are well controlled.       Neck Pain is 3/10  ON 2023 COVID and RSV vaccine.  Did have 3 days of pain which exacerbated on the 3rd day.  She had difficulty turning her head with what she described to be nuchal rigidity.  In addition to the nuchal rigidity, patient was unable to swallow her pills which was new for her.  This lasted for multiple days.  Pain was 10/10 on those days. Did begin to have heaviness in shoulders and tingling in her mid-back which still continues to this day.    Did have trouble (unable to lift her arms over her head to do her hair). With both arms.  This is new for her.   She did have some facial swelling, pain and numbness during this  timeframe as well.  Is having more tingling in her neck and face over the past few weeks.  Pain did decrease to a 6/10.  Patient did start the lyrica during the course of this pain.  She uses this as needed.  She also took some tylenol during this episode.  Patient didn't start he tizanidine yet as there was a delay obtaining the medication. Patient does neck exercises at least 2-4 times a week and has been doing this since .  Has not had improvement with this.  Did have PT in the past where she learned the neck exercises and did not notice any benefit with this for her pain.  However, this episode listed about was worse than before.  Will notice tingling in her legs when she lays down.  This is on occasion.       As of 2024 is more noticeable.  Sends tingling and discomfort in her shoulders, back and left arm.  Is not terrible.  Usually daily but doesn't stop her from doing things.  Does come and go.       EM  Showed mild to moderated CTS and Left mild C 7 radiculopathy:   Neck pain: Saw pain management and had epidural injection which affected her sleep     2024 MRI C spine  Spondylotic degenerative changes are seen particularly at C3-4 and C4-5 where mild to moderate central stenosis is noted with moderate left foraminal narrowing at C3-4 and moderate right foraminal narrowing at C4-5.     Moderate right foraminal narrowing noted at C5-6 with mild central stenosis. No cord compression or cord signal abnormality.     Have you used CBD or THC for your headaches and how often? No     Panic attack/anxiety:   PCP started her on Lexapro 5 mg.  She states that since she has been on Lexapro she has not had any chest tightness.  She does still get some panic attacks but they have decreased in frequency significantly they may occur 1 a month--not full panic attacks but more anxiety due to COVID etc.  Able to let it go more easily  As of 23 this has improved       Left arm pain:  no new concerns.  "She now feels that she is better.  Patient states this has resolved     Paresthesia in her limbs:   Patient underwent left knee arthroscopy and had an episode of syncope the next day.  Patient did fall and had head injury (stitches).  Did have worsening for a few weeks after this.  MOst recently is her jaw.  This is worse in the evening for her.  Almost feels like a throbbing in jaw line bilaterally and into cheeks.  Pain is 2/10.  Happens 1 time every week for the past few months.  Can also happen if she does any physical activity.       Reviewed old notes from physician seen in the past- see above HPI for summary of previous encounter    Review of Systems    Objective     /68 (BP Location: Left arm, Patient Position: Sitting, Cuff Size: Standard)   Pulse 84   Temp 98.4 °F (36.9 °C) (Temporal)   Ht 5' 2\" (1.575 m)   Wt 56.3 kg (124 lb 3.2 oz)   BMI 22.72 kg/m²     Physical Exam  CONSTITUTIONAL: Well developed, well nourished, well groomed. No dysmorphic features.     Eyes:  EOM normal      Neck:  Normal ROM, neck supple.      HEENT:  Normocephalic atraumatic.    Chest:  Respirations regular and unlabored.    Psychiatric:  Normal behavior and appropriate affect      MENTAL STATUS  Orientation: Alert and oriented x 3  Fund of knowledge: Intact.    MOTOR (Upper and lower extremities)   Bulk/tone/abnormal movement: Normal muscle bulk and tone.      COORDINATION   Station/Gait: Normal baseline gait.    Administrative Statements   I have spent a total time of 31 minutes on 06/17/24 In caring for this patient including Diagnostic results, Prognosis, Risks and benefits of tx options, Instructions for management, Patient and family education, Importance of tx compliance, Risk factor reductions, Impressions, Counseling / Coordination of care, Documenting in the medical record, Reviewing / ordering tests, medicine, procedures  , and Obtaining or reviewing history  .        "

## 2024-10-03 DIAGNOSIS — R20.2 PARESTHESIAS: ICD-10-CM

## 2024-10-03 DIAGNOSIS — M54.12 CERVICAL RADICULOPATHY: ICD-10-CM

## 2024-10-04 RX ORDER — PREGABALIN 25 MG/1
25 CAPSULE ORAL AS NEEDED
Qty: 30 CAPSULE | Refills: 0 | Status: SHIPPED | OUTPATIENT
Start: 2024-10-04

## 2024-10-07 ENCOUNTER — TELEPHONE (OUTPATIENT)
Dept: NEUROLOGY | Facility: CLINIC | Age: 74
End: 2024-10-07

## 2024-10-07 DIAGNOSIS — M54.12 CERVICAL RADICULOPATHY: ICD-10-CM

## 2024-10-07 DIAGNOSIS — R20.2 PARESTHESIAS: ICD-10-CM

## 2024-10-11 NOTE — TELEPHONE ENCOUNTER
PA approved through 10/6/25    Approval letter faxed to [employer] at [phone]    Called [employer] home [facility], spoke to Melina and made aware of the approval. She got a paid claim and pt will receive the med within 5-7 days.

## 2024-10-18 NOTE — TELEPHONE ENCOUNTER
Called [facility] at [phone] and spoke to   Sowmya, pharmacist and made her aware of below.  They will get this ready for pt

## 2024-10-18 NOTE — TELEPHONE ENCOUNTER
Dalia (pharmacist) Calling from [facility] to inform that patient has an Allergy to Gabapentin. Prior to filling the Lyrica , they need provider to confirm if still agreeable to fill due to Allergy, as Lyrica is in the same family as Gabapentin.    Routed to provider to advise .    [facility]   #   Reference # [id number]

## 2025-01-16 DIAGNOSIS — G43.709 CHRONIC MIGRAINE WITHOUT AURA WITHOUT STATUS MIGRAINOSUS, NOT INTRACTABLE: ICD-10-CM

## 2025-01-16 RX ORDER — NORTRIPTYLINE HYDROCHLORIDE 10 MG/1
40 CAPSULE ORAL
Qty: 360 CAPSULE | Refills: 1 | Status: SHIPPED | OUTPATIENT
Start: 2025-01-16

## 2025-01-21 ENCOUNTER — HOSPITAL ENCOUNTER (OUTPATIENT)
Dept: MAMMOGRAPHY | Facility: MEDICAL CENTER | Age: 75
Discharge: HOME | End: 2025-01-21
Payer: COMMERCIAL

## 2025-01-21 VITALS — WEIGHT: 120 LBS | BODY MASS INDEX: 22.08 KG/M2 | HEIGHT: 62 IN

## 2025-01-21 DIAGNOSIS — Z12.31 ENCOUNTER FOR SCREENING MAMMOGRAM FOR MALIGNANT NEOPLASM OF BREAST: ICD-10-CM

## 2025-01-21 PROCEDURE — 77063 BREAST TOMOSYNTHESIS BI: CPT

## 2025-01-21 PROCEDURE — 77067 SCR MAMMO BI INCL CAD: CPT

## 2025-02-13 ENCOUNTER — HOSPITAL ENCOUNTER (OUTPATIENT)
Dept: RADIOLOGY | Facility: CLINIC | Age: 75
End: 2025-02-13
Payer: COMMERCIAL

## 2025-02-13 VITALS — HEIGHT: 62 IN | BODY MASS INDEX: 22.08 KG/M2 | WEIGHT: 120 LBS

## 2025-02-13 DIAGNOSIS — R92.8 ABNORMAL SCREENING MAMMOGRAM: ICD-10-CM

## 2025-02-13 PROCEDURE — G0279 TOMOSYNTHESIS, MAMMO: HCPCS

## 2025-02-13 PROCEDURE — 77065 DX MAMMO INCL CAD UNI: CPT

## 2025-02-13 PROCEDURE — 76642 ULTRASOUND BREAST LIMITED: CPT

## 2025-06-18 ENCOUNTER — OFFICE VISIT (OUTPATIENT)
Dept: NEUROLOGY | Facility: CLINIC | Age: 75
End: 2025-06-18
Payer: COMMERCIAL

## 2025-06-18 VITALS
SYSTOLIC BLOOD PRESSURE: 116 MMHG | TEMPERATURE: 98.2 F | BODY MASS INDEX: 21.95 KG/M2 | WEIGHT: 119.3 LBS | HEIGHT: 62 IN | DIASTOLIC BLOOD PRESSURE: 60 MMHG | HEART RATE: 80 BPM

## 2025-06-18 DIAGNOSIS — R20.2 NUMBNESS AND TINGLING OF BOTH UPPER EXTREMITIES: ICD-10-CM

## 2025-06-18 DIAGNOSIS — M54.12 CERVICAL RADICULOPATHY: ICD-10-CM

## 2025-06-18 DIAGNOSIS — R20.2 PARESTHESIAS: ICD-10-CM

## 2025-06-18 DIAGNOSIS — R20.0 NUMBNESS AND TINGLING OF BOTH UPPER EXTREMITIES: ICD-10-CM

## 2025-06-18 DIAGNOSIS — G43.709 CHRONIC MIGRAINE WITHOUT AURA WITHOUT STATUS MIGRAINOSUS, NOT INTRACTABLE: Primary | ICD-10-CM

## 2025-06-18 PROCEDURE — 99214 OFFICE O/P EST MOD 30 MIN: CPT | Performed by: PHYSICIAN ASSISTANT

## 2025-06-18 RX ORDER — NORTRIPTYLINE HYDROCHLORIDE 10 MG/1
40 CAPSULE ORAL
Qty: 360 CAPSULE | Refills: 3 | Status: SHIPPED | OUTPATIENT
Start: 2025-06-18

## 2025-06-18 NOTE — ASSESSMENT & PLAN NOTE
Continue Nortriptyline 40 mg at bedtime  Continue Lexapro 20 mg per pcp     Use lyrica 25 mg at onset of your severe tingling and pain.  May repeat in 8 hours if needed.  Please call if not effective to increase the dose     Follow up in 1 year  Call if worsens     Orders:  •  nortriptyline (PAMELOR) 10 mg capsule; Take 4 capsules (40 mg total) by mouth daily at bedtime

## 2025-06-18 NOTE — PROGRESS NOTES
Name: Charlotte Baker      : 1950      MRN: [id number]  Encounter Provider: Omayra Tran PA-C  Encounter Date: 2025   Encounter department: NEUROLOGY [facility]  :  Assessment & Plan  Chronic migraine without aura without status migrainosus, not intractable  Continue Nortriptyline 40 mg at bedtime  Continue Lexapro 20 mg per pcp     Use lyrica 25 mg at onset of your severe tingling and pain.  May repeat in 8 hours if needed.  Please call if not effective to increase the dose     Follow up in 1 year  Call if worsens     Orders:  •  nortriptyline (PAMELOR) 10 mg capsule; Take 4 capsules (40 mg total) by mouth daily at bedtime    Cervical radiculopathy  Continue to monitor       Numbness and tingling of both upper extremities  Continue to monitor       Paresthesias               History of Present Illness   HPI   Charlotte Baker is a 75 y.o. female who presents for follow up on her neck pain and headaches     She is a retired .     Headaches have been well controlled.  States that when the weather is bad her headaches are worse.  Wakes up with the headaches most morning during change of season.  Otherwise headaches are well controlled.    Headache is a 1/10.   Gets her headaches related to weather and spring.  Pain usually doesn't usually go higher than a 2/10 and goes away in 2+ hours.       Neck Pain is 1/10  ON 2023 COVID and RSV vaccine.  Did have 3 days of pain which exacerbated on the 3rd day.  She had difficulty turning her head with what she described to be nuchal rigidity.  In addition to the nuchal rigidity, patient was unable to swallow her pills which was new for her.  This lasted for multiple days.  Pain was 10/10 on those days. Did begin to have heaviness in shoulders and tingling in her mid-back which still continues to this day.    Did have trouble (unable to lift her arms over her head to do her hair). With both arms.  This is new for her.    She did have some facial swelling, pain and numbness during this timeframe as well.  Is having more tingling in her neck and face over the past few weeks.  Pain did decrease to a 6/10.  Patient did start the lyrica during the course of this pain.  She uses this as needed.  She also took some tylenol during this episode.  Patient didn't start he tizanidine yet as there was a delay obtaining the medication. Patient does neck exercises at least 2-4 times a week and has been doing this since .  Has not had improvement with this.  Did have PT in the past where she learned the neck exercises and did not notice any benefit with this for her pain.  However, this episode listed about was worse than before.  Will notice tingling in her legs when she lays down.  This is on occasion.       As of 2024 is more noticeable.  Sends tingling and discomfort in her shoulders, back and left arm.  Is not terrible.  Usually daily but doesn't stop her from doing things.  Does come and go.    As of 2025:  Variable as to how much she is doing.  If she does a lot it is worse than if she does less.  Pain can get to a 4/10.  But a few weeks ago was planting. Lifting and pain was an 8/10.  This is if she does too much.  That pain lasted approx 3 days.  Takes lyrica as needed at bedtime until it resolves.        EM  Showed mild to moderated CTS and Left mild C 7 radiculopathy:   Neck pain: Saw pain management and had epidural injection which affected her sleep     2024 MRI C spine  Spondylotic degenerative changes are seen particularly at C3-4 and C4-5 where mild to moderate central stenosis is noted with moderate left foraminal narrowing at C3-4 and moderate right foraminal narrowing at C4-5.     Moderate right foraminal narrowing noted at C5-6 with mild central stenosis. No cord compression or cord signal abnormality.     Have you used CBD or THC for your headaches and how often? No     Panic attack/anxiety:   PCP  "started her on Lexapro 5 mg.  She states that since she has been on Lexapro she has not had any chest tightness.  She does still get some panic attacks but they have decreased in frequency significantly they may occur 1 a month--not full panic attacks but more anxiety due to COVID etc.  Able to let it go more easily  As of 6/16/23 this has improved       Left arm pain:  no new concerns. She now feels that she is better.  Patient states this has resolved     Paresthesia in her limbs:   Patient underwent left knee arthroscopy and had an episode of syncope the next day.  Patient did fall and had head injury (stitches).  Did have worsening for a few weeks after this.  MOst recently is her jaw.  This is worse in the evening for her.  Almost feels like a throbbing in jaw line bilaterally and into cheeks.  Pain is 2/10.  Happens 1 time every week for the past few months.  Can also happen if she does any physical activity.       Reviewed old notes from physician seen in the past- see above HPI for summary of previous encounter    Review of Systems I have personally reviewed the MA's review of systems and made changes as necessary.         Objective   /60 (BP Location: Left arm, Patient Position: Sitting, Cuff Size: Standard)   Pulse 80   Temp 98.2 °F (36.8 °C) (Temporal)   Ht 5' 2\" (1.575 m)   Wt 54.1 kg (119 lb 4.8 oz)   BMI 21.82 kg/m²     Physical Exam  Neurological Exam  CONSTITUTIONAL: Well developed, well nourished, well groomed. No dysmorphic features.     HEENT:  Normocephalic atraumatic.    Chest:  Respirations regular and unlabored.    Psychiatric:  Normal behavior and appropriate affect      MENTAL STATUS  Orientation: Alert and oriented x 3  Fund of knowledge: Intact.        Administrative Statements   I have spent a total time of 33 minutes in caring for this patient on the day of the visit/encounter including Prognosis, Risks and benefits of tx options, Instructions for management, Risk factor " reductions, Impressions, Counseling / Coordination of care, Documenting in the medical record, Reviewing/placing orders in the medical record (including tests, medications, and/or procedures), and Obtaining or reviewing history  .

## 2025-06-18 NOTE — PATIENT INSTRUCTIONS
Continue Nortriptyline 40 mg at bedtime  Continue Lexapro 20 mg per pcp    Use lyrica 25 mg at onset of your severe tingling and pain.  May repeat in 8 hours if needed.  Please call if not effective to increase the dose    Follow up in 1 year  Call if worsens